# Patient Record
(demographics unavailable — no encounter records)

---

## 2024-11-12 NOTE — ASSESSMENT
[FreeTextEntry1] : 88 yo hx HTN- controlled, MARIAM - not treated (dx 8 ys ago), OA noted back/ hands/ feet and knees, PsA  and mild cognitive impairment and + PPD/ QFT.. (for many ys)  1) PsA: ESR 81 / CRP 3.9 -> 9/21 ESR 21, CRP nl  with nl SI joiints 7/20-> 7/22 SI joints nl on LS films finally with minimal  R SI joint pain/ stiffness- XR 4/24 nl SI joints..since resuming MTX 15 mg/ wk ..  pustular psoriasis also greatly improved. Initially presented with mild synovitis in several IP/ and MCPs and R shoulder pain (but was s/p fall so was difficult to discern.) R shoulder better now following PT and kenalog injection SAB 5/24... though mild discomfort persists, tolerable.   Occas use meloxicam w/ minimal relief..and encouraged to minimize use of NSAIDs  NOTE: Tapered off MTX but noted R SI ttt and more severe pustular psoriasis; also bothered by hair loss When necessary excellent response to steroids  - XR Rt. Hip 03/17/22 showed preserved right hip joint space with chronic mild enthesophyte formation along the peripheral right iliac crest and lateral greater trochanter margin, generalized osteopenia - XR LS 03/27/22 showed no compression fractures or spondylosis - unchanged multilevel degenerative disc disease with disc space narrowing, unremarkable SI joints, generalized osteopenia. hand xray neg for CPPD in 2020 Otezla approved but too expensive $$ 1100/ m..  2) Myofascial pain: intermittently severe usually at upper back/ neck. Presents urgently today with severe pain in R neck/ trap area... exquisite TP... given injection today with kenalog 80 mg and 2 ml xylocaine... in effort to reduce inflammation- since need to avoid daily PO NSAIDs.  GIven degree of discomfort, also gave 30 mg Toradol.. advised to monitor pressures at home, hydration.  Advised to go to ER if any HA,, CP, palpitations.  Articulates awareness of this..  3) Osteopenia: Updated 8/9/22 w/ most severe T score -2.1 with neg VFA and frax 21% overall risk and 7% risk hip fracture... Unable to do comparison 2/2 different equipment. No treatment to date, no hx fractures. Will follow closely... should get updated study now  4) Overweight - Pre-DM?/HTN/ Peripheral edema: Edema: nearly 100% resolved at this time. considerable pitting for several ys, uncomfortable, + US abd 8/22 NAFLD HTN: well controlled Pre-DM - last HgbA1c 6.3 on 09/2021-> stable 1/23 6.2, needs to be coordinated and managed by PCP.. NOTE: Doppler 8/20 negative for DVT (no previous hx) Tx in past w/ diuretics which were stopped when thought to have gout. DOES NOT have GOUT.  5) Mild dyscognition: struggling with short term memory, insight into clinical condition fair at best, was supposed to make appt with neuro but "forgot".. doing fairly well since last visit- caring for self/ son and home- making appointments as needed.- still driving- with no issues reported.  No need to add or adjust at this time. Not sure if she would benefit from treatment to limit progression. Should really f/u with neuro as repeatedly suggested.  Health Mnt: - Colonoscopy > 10 ys ago neg, mammo 2019 neg; no recent CXR , Dexa 2020 nl, will repeat by end of year 2022 Vaccinations:  Needs to hold MTx 1 wk after any vaccination HD influenza annually Shingrix:  done  PNA: given 13 & 23..  Covid: initial vax w/ 1 booster  RSV   Plan 11/12/24: - Administered 40 Kenalog steroid to R trapezius TP - Give more Tramadol (only has ~5 tabs left) - Follow-up in 3mo  Plan:  - Given R deltoid IM toradol 30 mg - avoid all oral NSAIDS   - TPI 80 mg kenalog and 2 ml xylocaine R sided TPI trapezius muscle  - HD fluzone L deltoid   - complete labs before next visit (orders on file)  - continue current regimen   - continue methotrexate 6 pills weekly  - Increase  folic acid to 3 mg daily..  everyday besides the day you take the methotrexate to help with hair thinning   - RPA 3 m   - hold MTX following any vaccination..   - -Is aware to call if there is any change in her underlying symptoms- or CP, SOB/ MCCORMICK or severe HA go to ER / call 911

## 2024-11-12 NOTE — PHYSICAL EXAM
[General Appearance - Alert] : alert [General Appearance - In No Acute Distress] : in no acute distress [General Appearance - Well Nourished] : well nourished [General Appearance - Well Developed] : well developed [General Appearance - Well-Appearing] : healthy appearing [Sclera] : the sclera and conjunctiva were normal [PERRL With Normal Accommodation] : pupils were equal in size, round, and reactive to light [Outer Ear] : the ears and nose were normal in appearance [Examination Of The Oral Cavity] : the lips and gums were normal [Oropharynx] : the oropharynx was normal [Neck Appearance] : the appearance of the neck was normal [] : no respiratory distress [Respiration, Rhythm And Depth] : normal respiratory rhythm and effort [Exaggerated Use Of Accessory Muscles For Inspiration] : no accessory muscle use [Auscultation Breath Sounds / Voice Sounds] : lungs were clear to auscultation bilaterally [Heart Rate And Rhythm] : heart rate was normal and rhythm regular [Heart Sounds] : normal S1 and S2 [Heart Sounds Gallop] : no gallops [Murmurs] : no murmurs [Heart Sounds Pericardial Friction Rub] : no pericardial rub [Veins - Varicosity Changes] : there were no varicosital changes [Breast Appearance] : normal in appearance [Breast Palpation Mass] : no palpable masses [Cervical Lymph Nodes Enlarged Posterior Bilaterally] : posterior cervical [Cervical Lymph Nodes Enlarged Anterior Bilaterally] : anterior cervical [Supraclavicular Lymph Nodes Enlarged Bilaterally] : supraclavicular [No CVA Tenderness] : no ~M costovertebral angle tenderness [No Spinal Tenderness] : no spinal tenderness [Skin Color & Pigmentation] : normal skin color and pigmentation [Skin Turgor] : normal skin turgor [Motor Exam] : the motor exam was normal [No Focal Deficits] : no focal deficits [Oriented To Time, Place, And Person] : oriented to person, place, and time [Affect] : the affect was normal [Mood] : the mood was normal [FreeTextEntry1] : BL shoulders lack 5-10 (improved) full abduction active/ passive with POM R and point minimal TTP throughout, previous fullness resolved-  R hip limited by 20-30 degrees and L hip by 10-20 degrees but no POM; bl knees lacking 5-10 degrees R slightly> L; no active synovitis ttt above b/l radial styloid process- RESOLVED, fROM of wrists and fingers,

## 2024-11-12 NOTE — PROCEDURE
[Today's Date:] : Date: [unfilled] [Risks] : risks [Benefits] : benefits [Alternatives] : alternatives [Consent Obtained] : written consent was obtained prior to the procedure and is detailed in the patient's record [Patient] : Prior to the start of the procedure a time out was taken and the identity of the patient was confirmed via name and date of birth with the patient. The correct site and the procedure to be performed were confirmed. The correct side was confirmed if applicable. The availability of the correct equipment was verified [Therapeutic] : therapeutic [#1 Site: ______] : #1 site identified in the [unfilled] [___ ml Inj] : [unfilled] ~Uml [1%] : 1%  [Alcohol] : alcohol [25 gauge 1 inch] : A 25 gauge 1 inch needle was used [___ml Steriod Preparation] : [unfilled] ml of steriod preparation  [Tolerated Well] : the patient tolerated the procedure well [No Complications] : there were no complications [Instructions Given] : handouts/patient instructions were given to patient [Patient Instructed to Call] : patient was instructed to call if redness at site, a decrease in range of motion or an increase in pain is noted after procedure. [de-identified] : Used 0.5cc of 80 Kenalog [FreeTextEntry1] : This is a strong steroid.. can cause anxiety, hunger, irritability, trouble sleeping, rarely causes palpitations or chest pain but if present go to ER and then let me know

## 2024-11-12 NOTE — DATA REVIEWED
[FreeTextEntry1] : Labs 3/20 ESR 81/ CRP 3.26, CMP x Cr 1.35 w/ GFR 36, uric acid 5.0, nl C3/4  SPEP, CBC, vit D 75, TSh 0.97\par  HbA1c 6.5 \par  neg RF, CCP, CHAU, dsDNA, EDMUNDO, SSA/ B\par  \par  Imaging: \par  4/21 MR shoulder R :  diffuse tendinosis RTC/ triceps w/ hematoma and partial tear inferior GH ligament, non displaced subchondral fracture, Moderate GH effusion w/ hematoma.. \par  \par  SI joints 7/20 nl but chronic enthesopathic change both GTB and peripheral iliac crest margins... \par  CXR 7/20 nl \par  \par  Hand 1/20 nl w/ no erosions but generalized mild osteopenia no discrete lytic or blastic lesions

## 2024-11-12 NOTE — HISTORY OF PRESENT ILLNESS
[FreeTextEntry1] : 11/12/24  CC: Severe neck pain Gave TPI in neck last visit, helped  Has tarted PT since, helps sometimes but also sometimes makes it worse, was told to take this week off Has 37.5/325 Tramadol, took maybe 2-3 times past month - gets relief from it but about the same was from Tylenol Takes 2 Tylenol in a day when she's in pain Blood tests look good    9/24/24  had been doing well now presents with acute severe R sided neck / shoulder pain throughout the day with limited ROM neck and R shoulder fROM Stable overall..  R shoulder pain improved w/ 40 mg into SAB last visit 5/30/24.. but still uncomfortable - no longer limits activity.  Stable meds/ labs.. no recent illness.  Remains off steroids continues w/ 15 mg MTX  Psoriasis fully controlled  -stable wt 135-145 up/ down over past year.  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 OA noted hands/ feet and knees -  New onset Pain in R hand radiating to elbow and feet/ L knee , stiffness in multiple PIP/ wrist noted initially in Jan with severe stiffness up to 60 mins no other joints at this time... in past 4 ys ago b/l feet, seen by rheum   given wrist splint and steroids w/ + response last dose more than 1 m ago.. G4 G2 2 tubal pregnancies s/p Hysterectomy.  Tx  prednisone taper with + response.   Occas use meloxicam w/ minimal  relief..  Associated w/ chronic fatigue, occas clammy sensation in am, resolves, no lymphadenopathy,  Recently had all teeth pulled on top.. mild persistent nausea every since... no considerable wt gain/ loss no new rashes..   Health Mnt:  - Colonoscopy 10 ys ago neg, mammo 2019 neg; no recent CXR , Dexa few ys ago nl

## 2024-11-12 NOTE — REVIEW OF SYSTEMS
[Dry Eyes] : dryness of the eyes [As Noted in HPI] : as noted in HPI [Arthralgias] : arthralgias [Joint Stiffness] : joint stiffness [Skin Lesions] : skin lesion [Difficulty Walking] : difficulty walking [Negative] : Heme/Lymph [Lower Ext Edema] : no lower extremity edema [SOB on Exertion] : no shortness of breath during exertion [Dysuria] : no dysuria [Dizziness] : no dizziness [FreeTextEntry2] : functional - completes all household chores, lives with 2 sons (ages 62 & 66) and remains active - wt relatively stable - despite c/o stiffness [FreeTextEntry3] : Previous retinal tear resolved FULLY-  [FreeTextEntry4] : has postnasal drip  [FreeTextEntry5] : denies edema.. [FreeTextEntry8] : hx of incontinence in past.  [FreeTextEntry9] : worse in her hips, knees, and back over the past 2 weeks  [de-identified] : no active rash;

## 2025-02-07 NOTE — REVIEW OF SYSTEMS
[Dry Eyes] : dryness of the eyes [Arthralgias] : arthralgias [Joint Stiffness] : joint stiffness [Skin Lesions] : skin lesion [Difficulty Walking] : difficulty walking [Negative] : Heme/Lymph [As Noted in HPI] : as noted in HPI [Joint Pain] : joint pain [Lower Ext Edema] : no lower extremity edema [SOB on Exertion] : no shortness of breath during exertion [Dysuria] : no dysuria [Dizziness] : no dizziness [FreeTextEntry2] : functional - completes all household chores, lives with 2 sons (ages 62 & 66) and remains active - wt relatively stable - despite c/o stiffness [FreeTextEntry3] : Previous retinal tear resolved FULLY-  [FreeTextEntry4] : has postnasal drip  [FreeTextEntry5] : denies edema.. [FreeTextEntry8] : hx of incontinence in past.  [FreeTextEntry9] : New LUE wrist to L deltoid tenderness and warm to touch < worse in her hips, knees, and back over the past 2 weeks  [de-identified] : no active rash;

## 2025-02-07 NOTE — PHYSICAL EXAM
[General Appearance - Alert] : alert [General Appearance - In No Acute Distress] : in no acute distress [General Appearance - Well Nourished] : well nourished [General Appearance - Well Developed] : well developed [General Appearance - Well-Appearing] : healthy appearing [Sclera] : the sclera and conjunctiva were normal [PERRL With Normal Accommodation] : pupils were equal in size, round, and reactive to light [Outer Ear] : the ears and nose were normal in appearance [Examination Of The Oral Cavity] : the lips and gums were normal [Oropharynx] : the oropharynx was normal [Neck Appearance] : the appearance of the neck was normal [] : no respiratory distress [Respiration, Rhythm And Depth] : normal respiratory rhythm and effort [Exaggerated Use Of Accessory Muscles For Inspiration] : no accessory muscle use [Auscultation Breath Sounds / Voice Sounds] : lungs were clear to auscultation bilaterally [Heart Rate And Rhythm] : heart rate was normal and rhythm regular [Heart Sounds] : normal S1 and S2 [Heart Sounds Gallop] : no gallops [Murmurs] : no murmurs [Heart Sounds Pericardial Friction Rub] : no pericardial rub [Veins - Varicosity Changes] : there were no varicosital changes [Breast Appearance] : normal in appearance [Breast Palpation Mass] : no palpable masses [Cervical Lymph Nodes Enlarged Posterior Bilaterally] : posterior cervical [Cervical Lymph Nodes Enlarged Anterior Bilaterally] : anterior cervical [Supraclavicular Lymph Nodes Enlarged Bilaterally] : supraclavicular [No CVA Tenderness] : no ~M costovertebral angle tenderness [Skin Color & Pigmentation] : normal skin color and pigmentation [Skin Turgor] : normal skin turgor [Motor Exam] : the motor exam was normal [No Focal Deficits] : no focal deficits [Oriented To Time, Place, And Person] : oriented to person, place, and time [Affect] : the affect was normal [Mood] : the mood was normal [No Spinal Tenderness] : no spinal tenderness [FreeTextEntry1] : TOday fROM b/l shoulders, LUE wtt, more flushed color, ttp L wrist, w/ fROM, no ttp distal to the wrist, L deltoid and forearm ttp. Previously BL shoulders lack 5-10 (improved) full abduction active/ passive with POM R and point minimal TTP throughout, previous fullness resolved-  R hip limited by 20-30 degrees and L hip by 10-20 degrees but no POM; bl knees lacking 5-10 degrees R slightly> L; no active synovitis ttt above b/l radial styloid process- RESOLVED, fROM of wrists and fingers,

## 2025-02-07 NOTE — DATA REVIEWED
[FreeTextEntry1] : Labs 3/20 ESR 81/ CRP 3.26, CMP x Cr 1.35 w/ GFR 36, uric acid 5.0, nl C3/4  SPEP, CBC, vit D 75, TSh 0.97\par  HbA1c 6.5 \par  neg RF, CCP, CHAU, dsDNA, EDMUNDO, SSA/ B\par  \par  Imaging: \par  4/21 MR shoulder R :  diffuse tendinosis RTC/ triceps w/ hematoma and partial tear inferior GH ligament, non displaced subchondral fracture, Moderate GH effusion w/ hematoma.. \par  \par  SI joints 7/20 nl but chronic enthesopathic change both GTB and peripheral iliac crest margins... \par  CXR 7/20 nl \par  \par  Hand 1/20 nl w/ no erosions but generalized mild osteopenia no discrete lytic or blastic lesions 3 = A little assistance

## 2025-02-07 NOTE — PHYSICAL EXAM
[General Appearance - Alert] : alert [General Appearance - In No Acute Distress] : in no acute distress [General Appearance - Well Nourished] : well nourished [General Appearance - Well Developed] : well developed [General Appearance - Well-Appearing] : healthy appearing [Sclera] : the sclera and conjunctiva were normal [PERRL With Normal Accommodation] : pupils were equal in size, round, and reactive to light [Outer Ear] : the ears and nose were normal in appearance [Examination Of The Oral Cavity] : the lips and gums were normal [Oropharynx] : the oropharynx was normal [Neck Appearance] : the appearance of the neck was normal [] : no respiratory distress [Respiration, Rhythm And Depth] : normal respiratory rhythm and effort [Exaggerated Use Of Accessory Muscles For Inspiration] : no accessory muscle use [Auscultation Breath Sounds / Voice Sounds] : lungs were clear to auscultation bilaterally [Heart Rate And Rhythm] : heart rate was normal and rhythm regular [Heart Sounds] : normal S1 and S2 [Heart Sounds Gallop] : no gallops [Murmurs] : no murmurs [Heart Sounds Pericardial Friction Rub] : no pericardial rub [Veins - Varicosity Changes] : there were no varicosital changes [Breast Palpation Mass] : no palpable masses [Breast Appearance] : normal in appearance [Cervical Lymph Nodes Enlarged Posterior Bilaterally] : posterior cervical [Cervical Lymph Nodes Enlarged Anterior Bilaterally] : anterior cervical [Supraclavicular Lymph Nodes Enlarged Bilaterally] : supraclavicular [No CVA Tenderness] : no ~M costovertebral angle tenderness [Skin Color & Pigmentation] : normal skin color and pigmentation [Skin Turgor] : normal skin turgor [Motor Exam] : the motor exam was normal [No Focal Deficits] : no focal deficits [Oriented To Time, Place, And Person] : oriented to person, place, and time [Affect] : the affect was normal [Mood] : the mood was normal [No Spinal Tenderness] : no spinal tenderness [FreeTextEntry1] : TOday fROM b/l shoulders, LUE wtt, more flushed color, ttp L wrist, w/ fROM, no ttp distal to the wrist, L deltoid and forearm ttp. Previously BL shoulders lack 5-10 (improved) full abduction active/ passive with POM R and point minimal TTP throughout, previous fullness resolved-  R hip limited by 20-30 degrees and L hip by 10-20 degrees but no POM; bl knees lacking 5-10 degrees R slightly> L; no active synovitis ttt above b/l radial styloid process- RESOLVED, fROM of wrists and fingers,

## 2025-02-07 NOTE — REVIEW OF SYSTEMS
[Dry Eyes] : dryness of the eyes [Arthralgias] : arthralgias [Joint Stiffness] : joint stiffness [Skin Lesions] : skin lesion [Difficulty Walking] : difficulty walking [Negative] : Heme/Lymph [As Noted in HPI] : as noted in HPI [Joint Pain] : joint pain [Lower Ext Edema] : no lower extremity edema [SOB on Exertion] : no shortness of breath during exertion [Dysuria] : no dysuria [Dizziness] : no dizziness [FreeTextEntry2] : functional - completes all household chores, lives with 2 sons (ages 62 & 66) and remains active - wt relatively stable - despite c/o stiffness [FreeTextEntry3] : Previous retinal tear resolved FULLY-  [FreeTextEntry4] : has postnasal drip  [FreeTextEntry5] : denies edema.. [FreeTextEntry8] : hx of incontinence in past.  [FreeTextEntry9] : New LUE wrist to L deltoid tenderness and warm to touch < worse in her hips, knees, and back over the past 2 weeks  [de-identified] : no active rash;

## 2025-02-07 NOTE — ASSESSMENT
[FreeTextEntry1] : 90 yo hx HTN- controlled, MARIAM - not treated (dx 8 ys ago), OA noted back/ hands/ feet, knees and cervical , PsA  and mild cognitive impairment and + PPD/ QFT.. (for many ys)  1) PsA: ESR 81 / CRP 3.9 -> 9/21 ESR 21, CRP nl  with nl SI joiints 7/20-> 7/22 SI joints nl on LS films finally with minimal  R SI joint pain/ stiffness- XR 4/24 nl SI joints..since resuming MTX 15 mg/ wk ..  pustular psoriasis also greatly improved. Initially presented with mild synovitis in several IP/ and MCPs and R shoulder pain (but was s/p fall so was difficult to discern.) R shoulder better now following PT and kenalog injection SAB 5/24... though mild discomfort persists, tolerable- pain more focused in trapezius (see below).   Occas use meloxicam w/ minimal relief..and encouraged to minimize use of NSAIDs  NOTE: Tapered off MTX but noted R SI ttt and more severe pustular psoriasis; also bothered by hair loss When necessary excellent response to steroids  - XR Rt. Hip 03/17/22 showed preserved right hip joint space with chronic mild enthesophyte formation along the peripheral right iliac crest and lateral greater trochanter margin, generalized osteopenia - XR LS 03/27/22 showed no compression fractures or spondylosis - unchanged multilevel degenerative disc disease with disc space narrowing, unremarkable SI joints, generalized osteopenia. hand xray neg for CPPD in 2020 Otezla approved but too expensive $$ 1100/ m..  2) Myofascial pain: intermittently severe usually at upper back/ neck. Presents urgently today with severe pain in R neck/ trap area... exquisite TP... given injection today with kenalog 40 mg (used 0.5 ml- given 40 mg of 80 mg vial) and 2 ml xylocaine... in effort to reduce inflammation- since need to avoid daily PO NSAIDs.  GIven degree of discomfort, Advised to go to ER if any HA,, CP, palpitations.  Articulates awareness of this..  3) Osteopenia: Updated 8/9/22 w/ most severe T score -2.1 with neg VFA and frax 21% overall risk and 7% risk hip fracture... Unable to do comparison 2/2 different equipment. No treatment to date, no hx fractures. Will follow closely... should get updated study now  4) Overweight - Pre-DM?/HTN/ Peripheral edema: Edema: nearly 100% resolved at this time. considerable pitting for several ys, uncomfortable, + US abd 8/22 NAFLD HTN: well controlled Pre-DM - last HgbA1c 6.3 on 09/2021-> stable 1/23 6.2, needs to be coordinated and managed by PCP.. NOTE: Doppler 8/20 negative for DVT (no previous hx) Tx in past w/ diuretics which were stopped when thought to have gout. DOES NOT have GOUT.  5) Mild dyscognition: struggling with short term memory, insight into clinical condition fair at best, was supposed to make appt with neuro but "forgot".. doing fairly well since last visit- caring for self/ son and home- making appointments as needed.- still driving- with no issues reported.  No need to add or adjust at this time. Not sure if she would benefit from treatment to limit progression. Should really f/u with neuro as repeatedly suggested.  Health Mnt: - Colonoscopy > 10 ys ago neg, mammo 2019 neg; no recent CXR , Dexa 2020 nl, will repeat by end of year 2022 Vaccinations:  Needs to hold MTx 1 wk after any vaccination HD influenza annually Shingrix:  done  PNA: given 13 & 23..  Covid: initial vax w/ 1 booster  RSV   PLAN 2/7/25: -US L wrist - MSK, arterial, and venous doppler LUE further evaluation -Continue Celebrex 200mg daily -Continue MTX 6 tabs weekly -Additional labs taken today -Will call patient once results of labs and imaging received  Plan 11/12/24: - Administered 40 Kenalog steroid to R trapezius TP  - Give more Tramadol (only has ~5 tabs left)  - Follow-up in 3mo  - continue methotrexate 6 pills weekly  - continue  folic acid to 3 mg daily..  everyday besides the day you take the methotrexate to help with hair thinning   - -Is aware to call if there is any change in her underlying symptoms- or CP, SOB/ MCCORMICK or severe HA go to ER / call [phone]

## 2025-02-07 NOTE — ASSESSMENT
[FreeTextEntry1] : 88 yo hx HTN- controlled, MARIAM - not treated (dx 8 ys ago), OA noted back/ hands/ feet, knees and cervical , PsA  and mild cognitive impairment and + PPD/ QFT.. (for many ys)  1) PsA: ESR 81 / CRP 3.9 -> 9/21 ESR 21, CRP nl  with nl SI joiints 7/20-> 7/22 SI joints nl on LS films finally with minimal  R SI joint pain/ stiffness- XR 4/24 nl SI joints..since resuming MTX 15 mg/ wk ..  pustular psoriasis also greatly improved. Initially presented with mild synovitis in several IP/ and MCPs and R shoulder pain (but was s/p fall so was difficult to discern.) R shoulder better now following PT and kenalog injection SAB 5/24... though mild discomfort persists, tolerable- pain more focused in trapezius (see below).   Occas use meloxicam w/ minimal relief..and encouraged to minimize use of NSAIDs  NOTE: Tapered off MTX but noted R SI ttt and more severe pustular psoriasis; also bothered by hair loss When necessary excellent response to steroids  - XR Rt. Hip 03/17/22 showed preserved right hip joint space with chronic mild enthesophyte formation along the peripheral right iliac crest and lateral greater trochanter margin, generalized osteopenia - XR LS 03/27/22 showed no compression fractures or spondylosis - unchanged multilevel degenerative disc disease with disc space narrowing, unremarkable SI joints, generalized osteopenia. hand xray neg for CPPD in 2020 [facility] approved but too expensive $$ 1100/ m..  2) Myofascial pain: intermittently severe usually at upper back/ neck. Presents urgently today with severe pain in R neck/ trap area... exquisite TP... given injection today with kenalog 40 mg (used 0.5 ml- given 40 mg of 80 mg vial) and 2 ml xylocaine... in effort to reduce inflammation- since need to avoid daily PO NSAIDs.  GIven degree of discomfort, Advised to go to ER if any HA,, CP, palpitations.  Articulates awareness of this..  3) Osteopenia: Updated 8/9/22 w/ most severe T score -2.1 with neg VFA and frax 21% overall risk and 7% risk hip fracture... Unable to do comparison 2/2 different equipment. No treatment to date, no hx fractures. Will follow closely... should get updated study now  4) Overweight - Pre-DM?/HTN/ Peripheral edema: Edema: nearly 100% resolved at this time. considerable pitting for several ys, uncomfortable, + US abd 8/22 NAFLD HTN: well controlled Pre-DM - last HgbA1c 6.3 on 09/2021-> stable 1/23 6.2, needs to be coordinated and managed by PCP.. NOTE: Doppler 8/20 negative for DVT (no previous hx) Tx in past w/ diuretics which were stopped when thought to have gout. DOES NOT have GOUT.  5) Mild dyscognition: struggling with short term memory, insight into clinical condition fair at best, was supposed to make appt with neuro but "forgot".. doing fairly well since last visit- caring for self/ son and home- making appointments as needed.- still driving- with no issues reported.  No need to add or adjust at this time. Not sure if she would benefit from treatment to limit progression. Should really f/u with neuro as repeatedly suggested.  Health Mnt: - Colonoscopy > 10 ys ago neg, mammo 2019 neg; no recent CXR , Dexa 2020 nl, will repeat by end of year 2022 Vaccinations:  Needs to hold MTx 1 wk after any vaccination HD influenza annually Shingrix:  done  PNA: given 13 & 23..  Covid: initial vax w/ 1 booster  RSV   PLAN 2/7/25: -US L wrist - MSK, arterial, and venous doppler LUE further evaluation -Continue Celebrex 200mg daily -Continue MTX 6 tabs weekly -Additional labs taken today -Will call patient once results of labs and imaging received  Plan 11/12/24: - Administered 40 Kenalog steroid to R trapezius TP  - Give more Tramadol (only has ~5 tabs left)  - Follow-up in 3mo  - continue methotrexate 6 pills weekly  - continue  folic acid to 3 mg daily..  everyday besides the day you take the methotrexate to help with hair thinning   - -Is aware to call if there is any change in her underlying symptoms- or CP, SOB/ MCCORMICK or severe HA go to ER / call [phone]

## 2025-02-07 NOTE — ASSESSMENT
[FreeTextEntry1] : 88 yo hx HTN- controlled, MARIAM - not treated (dx 8 ys ago), OA noted back/ hands/ feet, knees and cervical , PsA  and mild cognitive impairment and + PPD/ QFT.. (for many ys)  1) PsA: ESR 81 / CRP 3.9 -> 9/21 ESR 21, CRP nl  with nl SI joiints 7/20-> 7/22 SI joints nl on LS films finally with minimal  R SI joint pain/ stiffness- XR 4/24 nl SI joints..since resuming MTX 15 mg/ wk ..  pustular psoriasis also greatly improved. Initially presented with mild synovitis in several IP/ and MCPs and R shoulder pain (but was s/p fall so was difficult to discern.) R shoulder better now following PT and kenalog injection SAB 5/24... though mild discomfort persists, tolerable- pain more focused in trapezius (see below).   Occas use meloxicam w/ minimal relief..and encouraged to minimize use of NSAIDs  NOTE: Tapered off MTX but noted R SI ttt and more severe pustular psoriasis; also bothered by hair loss When necessary excellent response to steroids  - XR Rt. Hip 03/17/22 showed preserved right hip joint space with chronic mild enthesophyte formation along the peripheral right iliac crest and lateral greater trochanter margin, generalized osteopenia - XR LS 03/27/22 showed no compression fractures or spondylosis - unchanged multilevel degenerative disc disease with disc space narrowing, unremarkable SI joints, generalized osteopenia. hand xray neg for CPPD in 2020 [facility] approved but too expensive $$ 1100/ m..  2) Myofascial pain: intermittently severe usually at upper back/ neck. Presents urgently today with severe pain in R neck/ trap area... exquisite TP... given injection today with kenalog 40 mg (used 0.5 ml- given 40 mg of 80 mg vial) and 2 ml xylocaine... in effort to reduce inflammation- since need to avoid daily PO NSAIDs.  GIven degree of discomfort, Advised to go to ER if any HA,, CP, palpitations.  Articulates awareness of this..  3) Osteopenia: Updated 8/9/22 w/ most severe T score -2.1 with neg VFA and frax 21% overall risk and 7% risk hip fracture... Unable to do comparison 2/2 different equipment. No treatment to date, no hx fractures. Will follow closely... should get updated study now  4) Overweight - Pre-DM?/HTN/ Peripheral edema: Edema: nearly 100% resolved at this time. considerable pitting for several ys, uncomfortable, + US abd 8/22 NAFLD HTN: well controlled Pre-DM - last HgbA1c 6.3 on 09/2021-> stable 1/23 6.2, needs to be coordinated and managed by PCP.. NOTE: Doppler 8/20 negative for DVT (no previous hx) Tx in past w/ diuretics which were stopped when thought to have gout. DOES NOT have GOUT.  5) Mild dyscognition: struggling with short term memory, insight into clinical condition fair at best, was supposed to make appt with neuro but "forgot".. doing fairly well since last visit- caring for self/ son and home- making appointments as needed.- still driving- with no issues reported.  No need to add or adjust at this time. Not sure if she would benefit from treatment to limit progression. Should really f/u with neuro as repeatedly suggested.  Health Mnt: - Colonoscopy > 10 ys ago neg, mammo 2019 neg; no recent CXR , Dexa 2020 nl, will repeat by end of year 2022 Vaccinations:  Needs to hold MTx 1 wk after any vaccination HD influenza annually Shingrix:  done  PNA: given 13 & 23..  Covid: initial vax w/ 1 booster  RSV   PLAN 2/7/25: -US L wrist - MSK, arterial, and venous doppler LUE further evaluation -Continue Celebrex 200mg daily -Continue MTX 6 tabs weekly -Additional labs taken today -Will call patient once results of labs and imaging received  Plan 11/12/24: - Administered 40 Kenalog steroid to R trapezius TP  - Give more Tramadol (only has ~5 tabs left)  - Follow-up in 3mo  - continue methotrexate 6 pills weekly  - continue  folic acid to 3 mg daily..  everyday besides the day you take the methotrexate to help with hair thinning   - -Is aware to call if there is any change in her underlying symptoms- or CP, SOB/ MCCORMICK or severe HA go to ER / call [phone]

## 2025-02-07 NOTE — HISTORY OF PRESENT ILLNESS
[FreeTextEntry1] : 2/7/25  -Presents L wrist pain started 1/26/25 notified PCP who ordered XR L wrist no evidence fx or erosions XR notes mild OA changes thorughout -Started Celebrex 200mg daily, pain started to improve 2/4/25 but also noted increased pain L deltoid -Pain is consistent throughout the day, has been elevating hand and using brace (intended for R wrist) -Continues regimen MTX 6 tabs weekly well tolerated -VS stable do not suggest active infection  2/3/25 labs: CRP 5, eGFR 29, chronic anemia, ESR 47; neg/nl UA B12 and folate not completed, no recent sUA  ---------------- 11/12/24 Urgent visit for  CC: Severe neck pain Gave TPI in neck last visit, helped..would like repeat course now  Has started PT since, helps sometimes but also sometimes makes it worse, was told to take this week off Has 37.5/325 Tramadol, took maybe 2-3 times past month - gets relief from it but about the same was from Tylenol Takes 2 Tylenol in a day when she's in pain Blood tests look good  R shoulder pain improved w/ 40 mg into SAB last visit 5/30/24.. but still uncomfortable - no longer limits activity.  Stable meds/ labs.. no recent illness.  Remains off steroids continues w/ 15 mg MTX  Psoriasis fully controlled  -stable wt 135-145 up/ down over past year.  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 OA noted hands/ feet and knees -  New onset Pain in R hand radiating to elbow and feet/ L knee , stiffness in multiple PIP/ wrist noted initially in Jan with severe stiffness up to 60 mins no other joints at this time... in past 4 ys ago b/l feet, seen by rheum   given wrist splint and steroids w/ + response last dose more than 1 m ago.. G4 G2 2 tubal pregnancies s/p Hysterectomy.  Tx  prednisone taper with + response.   Occas use meloxicam w/ minimal  relief..  Associated w/ chronic fatigue, occas clammy sensation in am, resolves, no lymphadenopathy,  Recently had all teeth pulled on top.. mild persistent nausea every since... no considerable wt gain/ loss no new rashes..   Health Mnt:  - Colonoscopy 10 ys ago neg, mammo 2019 neg; no recent CXR , Dexa few ys ago nl

## 2025-02-07 NOTE — HISTORY OF PRESENT ILLNESS
[FreeTextEntry1] : 2/7/25  -Presents L wrist pain started 1/26/25 notified PCP who ordered XR L wrist no evidence fx or erosions XR notes mild OA changes thorughout -Started Celebrex 200mg daily, pain started to improve 2/4/25 but also noted increased pain L deltoid -Pain is consistent throughout the day, has been elevating hand and using brace (intended for R wrist) -Continues regimen MTX 6 tabs weekly well tolerated -VS stable do not suggest active infection  2/3/25 labs: CRP 5, eGFR 29, chronic anemia, ESR 47; neg/nl UA B12 and folate not completed, no recent sUA  ---------------- 11/12/24 Urgent visit for  CC: Severe neck pain Gave TPI in neck last visit, helped..would like repeat course now  Has started PT since, helps sometimes but also sometimes makes it worse, was told to take this week off Has 37.5/325 Tramadol, took maybe 2-3 times past month - gets relief from it but about the same was from Tylenol Takes 2 Tylenol in a day when she's in pain Blood tests look good  R shoulder pain improved w/ 40 mg into SAB last visit 5/30/24.. but still uncomfortable - no longer limits activity.  Stable meds/ labs.. no recent illness.  Remains off steroids continues w/ 15 mg MTX  Psoriasis fully controlled  -stable wt 135-145 up/ down over past year.  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 OA noted hands/ feet and knees -  New onset Pain in R hand radiating to elbow and feet/ L knee , stiffness in multiple PIP/ wrist noted initially in Jan with severe stiffness up to 60 mins no other joints at this time... in past 4 ys ago b/l feet, seen by rheum   given wrist splint and steroids w/ + response last dose more than 1 m ago.. G4 G2 2 tubal pregnancies s/p Hysterectomy.  Tx  prednisone taper with + response.   Occas use meloxicam w/ minimal  relief..  Associated w/ chronic fatigue, occas clammy sensation in am, resolves, no lymphadenopathy,  Recently had all teeth pulled on top.. mild persistent nausea every since... no considerable wt gain/ loss no new rashes..   Health Mnt:  - Colonoscopy 10 ys ago neg, mammo 2019 neg; no recent CXR , Dexa few ys ago nl

## 2025-02-07 NOTE — REVIEW OF SYSTEMS
[Dry Eyes] : dryness of the eyes [Arthralgias] : arthralgias [Joint Stiffness] : joint stiffness [Skin Lesions] : skin lesion [Difficulty Walking] : difficulty walking [Negative] : Heme/Lymph [As Noted in HPI] : as noted in HPI [Joint Pain] : joint pain [Lower Ext Edema] : no lower extremity edema [SOB on Exertion] : no shortness of breath during exertion [Dysuria] : no dysuria [Dizziness] : no dizziness [FreeTextEntry2] : functional - completes all household chores, lives with 2 sons (ages 62 & 66) and remains active - wt relatively stable - despite c/o stiffness [FreeTextEntry3] : Previous retinal tear resolved FULLY-  [FreeTextEntry4] : has postnasal drip  [FreeTextEntry5] : denies edema.. [FreeTextEntry8] : hx of incontinence in past.  [FreeTextEntry9] : New LUE wrist to L deltoid tenderness and warm to touch < worse in her hips, knees, and back over the past 2 weeks  [de-identified] : no active rash;

## 2025-03-07 NOTE — ASSESSMENT
[FreeTextEntry1] : 90 yo hx HTN- controlled, MARIAM - not treated (dx 8 ys ago), OA noted back/ hands/ feet, knees and cervical , PsA  and mild cognitive impairment and + PPD/ QFT.. (for many ys)  **ACUTE Visit 2/7/25 Presents L wrist pain started 1/26/25 notified PCP who ordered XR L wrist no evidence fx or erosions XR notes mild OA changes thorughout Started Celebrex 200mg daily, pain started to improve 2/4/25 but also noted increased pain L deltoid Pain is consistent throughout the day, has been elevating hand and using brace (intended for R wrist) Continues regimen MTX 6 tabs weekly well tolerated VS stable do not suggest active infection On exam: Today fROM b/l shoulders, LUE wtt, more flushed color, ttp L wrist, w/ fROM, no ttp distal to the wrist, L deltoid and forearm ttp Presentation not s/w PsA or RA flare. Recent labs 2/25 stable additional sUA ordered today r/o gout Would recommend additional imaging eval for possible arterial or venous abn  1) PsA: initial ESR 81 / CRP 3.9 -> 9/21 ESR 21, CRP nl  with nl SI joiints 7/20-> 7/22 -> finally with minimal  R SI joint pain/ stiffness- XR 4/24 nl SI joints.  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 stable dose 15 mg wk tolerated fairly well Initially presented with mild synovitis in several IP/ and MCPs and R shoulder pain (but was s/p fall so was difficult to discern.) and overt pustular PsO-  -  R shoulder better now following PT and kenalog injection SAB 5/24... though mild discomfort persists, tolerable- pain more focused in trapezius (see below).  NOW with LUE fullness, precise etiology of swelling unclear- needs additional imaging- need to r/o possible arterial/ venous / Lymphadenopathy... doppler study ordered Occas use meloxicam w/ minimal relief..and encouraged to minimize use of NSAIDs  NOTE: Tapered off MTX but noted R SI ttt and more severe pustular psoriasis; also bothered by hair loss When necessary excellent response to steroids  - XR Rt. Hip 03/17/22 showed preserved right hip joint space with chronic mild enthesophyte formation along the peripheral right iliac crest and lateral greater trochanter margin, generalized osteopenia - XR LS 03/27/22 showed no compression fractures or spondylosis - unchanged multilevel degenerative disc disease with disc space narrowing, unremarkable SI joints, generalized osteopenia. hand xray neg for CPPD in 2020 [facility] approved but too expensive $$ 1100/ m..  2) Myofascial pain: intermittently severe usually at upper back/ neck. Presents urgently improved severe pain in R neck/ trap area... exquisite TP... given injection today with kenalog 40 mg- last visit + response.  Relatively controlled at this visit   3) Osteopenia: Updated 8/9/22 w/ most severe T score -2.1 with neg VFA and frax 21% overall risk and 7% risk hip fracture... Unable to do comparison 2/2 different equipment. No treatment to date, no hx fractures. Will follow closely... should get updated study now  4) Overweight - Pre-DM?/HTN/ Peripheral edema: Edema: nearly 100% resolved at this time. considerable pitting for several ys, uncomfortable, + US abd 8/22 NAFLD HTN: well controlled Pre-DM - last HgbA1c 6.3 on 09/2021-> stable 1/23 6.2, needs to be coordinated and managed by PCP.. NOTE: Doppler 8/20 negative for DVT (no previous hx) Tx in past w/ diuretics which were stopped when thought to have gout. DOES NOT have GOUT.  5) Mild dyscognition: struggling with short term memory, insight into clinical condition fair at best, was supposed to make appt with neuro but "forgot".. doing fairly well since last visit- caring for self/ son and home- making appointments as needed.- still driving- with no issues reported.  No need to add or adjust at this time. Not sure if she would benefit from treatment to limit progression. Should really f/u with neuro as repeatedly suggested.  Health Mnt: - Colonoscopy > 10 ys ago neg, mammo 2019 neg; no recent CXR , Dexa 2020 nl, will repeat by end of year 2022 Vaccinations:  Needs to hold MTx 1 wk after any vaccination HD influenza annually Shingrix:  done  PNA: given 13 & 23..  Covid: initial vax w/ 1 booster  RSV   PLAN 2/7/25: -US L wrist - MSK, arterial, and venous doppler LUE further evaluation  -Continue Celebrex 200mg daily as needed   -Did not request renewal tramadol  -Continue MTX 6 tabs weekly  - continue  folic acid to 3 mg daily..  everyday besides the day you take the methotrexate to help with hair thinning   -Additional labs taken today  -Is aware to call if there is any change in her underlying symptoms- or CP, SOB/ MCCORMICK or severe HA go to ER / call [phone]  -Will call patient once results of labs and imaging received

## 2025-03-07 NOTE — ADDENDUM
[FreeTextEntry1] : Labs from todays visit reviewed:  2/7/25 ESR 47, CBC w/ Hb 10.3/ WbC 7.58, plt 277, nl UA 4.8, B12/Folate, CMP with eGFR 49 w/ Cr 1.08, CRP 5  2/7/25 Arterial/ Venous US LUE NEG and MSK L hand/ wrist NEG for synovitis

## 2025-03-07 NOTE — PHYSICAL EXAM
[General Appearance - Alert] : alert [General Appearance - In No Acute Distress] : in no acute distress [General Appearance - Well Nourished] : well nourished [General Appearance - Well Developed] : well developed [General Appearance - Well-Appearing] : healthy appearing [Sclera] : the sclera and conjunctiva were normal [PERRL With Normal Accommodation] : pupils were equal in size, round, and reactive to light [Outer Ear] : the ears and nose were normal in appearance [Examination Of The Oral Cavity] : the lips and gums were normal [Oropharynx] : the oropharynx was normal [Neck Appearance] : the appearance of the neck was normal [] : no respiratory distress [Respiration, Rhythm And Depth] : normal respiratory rhythm and effort [Exaggerated Use Of Accessory Muscles For Inspiration] : no accessory muscle use [Auscultation Breath Sounds / Voice Sounds] : lungs were clear to auscultation bilaterally [Heart Rate And Rhythm] : heart rate was normal and rhythm regular [Heart Sounds] : normal S1 and S2 [Heart Sounds Gallop] : no gallops [Murmurs] : no murmurs [Heart Sounds Pericardial Friction Rub] : no pericardial rub [Veins - Varicosity Changes] : there were no varicosital changes [Breast Appearance] : normal in appearance [Breast Palpation Mass] : no palpable masses [No CVA Tenderness] : no ~M costovertebral angle tenderness [No Spinal Tenderness] : no spinal tenderness [Skin Color & Pigmentation] : normal skin color and pigmentation [Skin Turgor] : normal skin turgor [Motor Exam] : the motor exam was normal [No Focal Deficits] : no focal deficits [Oriented To Time, Place, And Person] : oriented to person, place, and time [Affect] : the affect was normal [Mood] : the mood was normal [FreeTextEntry1] : TOday fROM b/l shoulders, L wrist full/ warm, TTP and POM- limited lacking 5d; R wrist sl full and TTP;, L deltoid and forearm ttp- RESOLVED; . Previously BL shoulders lack 5-10 (improved) full abduction active/ passive with POM R and point minimal TTP throughout, previous fullness resolved-  R hip limited by 20-30 degrees and L hip by 10-20 degrees but no POM; bl knees lacking 5-10 degrees R slightly> L; no active synovitis ttt above b/l radial styloid process- RESOLVED, fROM of wrists and fingers,

## 2025-03-07 NOTE — HISTORY OF PRESENT ILLNESS
[FreeTextEntry1] : 3/7/25 started celebrex with minimal benefit L wrist continues to be uncomfortable, achiness/ at times burning and numbness in all 5 fingers.. few days ago noted tightness/ and numbness in R hand Also feels weakness in wrist.. specifically as well as increased pain with use.   labs 2/11/25 at onset CRP 5, ESR 47, Hb 10.3 (stable) with nl WBC, plt 277 Denies sx of UTI despite elevated WBC/ bacteria in urine  Continues with 6 tabs wkly..  MR Cervical spine 3/3/25 IMPRESSION: Cervical spondylosis resulting in moderate left foraminal narrowing at C3/C4 moderate to severe foraminal narrowing at C4/C5. No spinal canal stenosis    Plan 3/7/25:    2/7/25 ACUTE Visit   -Presents L wrist pain started 1/26/25 notified PCP who ordered XR L wrist no evidence fx or erosions XR notes mild OA changes thorughout -Started Celebrex 200mg daily, pain started to improve 2/4/25 but also noted increased pain L deltoid -Pain is consistent throughout the day, has been elevating hand and using brace (intended for R wrist) -Continues regimen MTX 6 tabs weekly well tolerated -VS stable do not suggest active infection  2/3/25 labs: CRP 5, eGFR 29, chronic anemia, ESR 47; neg/nl UA B12 and folate not completed, no recent sUA  ---------------- 11/12/24 Urgent visit for  CC: Severe neck pain Gave TPI in neck last visit, helped..would like repeat course now  Has started PT since, helps sometimes but also sometimes makes it worse, was told to take this week off Has 37.5/325 Tramadol, took maybe 2-3 times past month - gets relief from it but about the same was from Tylenol Takes 2 Tylenol in a day when she's in pain Blood tests look good  R shoulder pain improved w/ 40 mg into SAB last visit 5/30/24.. but still uncomfortable - no longer limits activity.  Stable meds/ labs.. no recent illness.  Remains off steroids continues w/ 15 mg MTX  Psoriasis fully controlled  -stable wt 135-145 up/ down over past year.  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 OA noted hands/ feet and knees -  New onset Pain in R hand radiating to elbow and feet/ L knee , stiffness in multiple PIP/ wrist noted initially in Jan with severe stiffness up to 60 mins no other joints at this time... in past 4 ys ago b/l feet, seen by rheum   given wrist splint and steroids w/ + response last dose more than 1 m ago.. G4 G2 2 tubal pregnancies s/p Hysterectomy.  Tx  prednisone taper with + response.   Occas use meloxicam w/ minimal  relief..  Associated w/ chronic fatigue, occas clammy sensation in am, resolves, no lymphadenopathy,  Recently had all teeth pulled on top.. mild persistent nausea every since... no considerable wt gain/ loss no new rashes..   Health Mnt:  - Colonoscopy 10 ys ago neg, mammo 2019 neg; no recent CXR , Dexa few ys ago nl

## 2025-03-07 NOTE — DATA REVIEWED
[FreeTextEntry1] : Labs  25 ESR 47, CBC w/ Hb 10.3/ WbC 7.58, plt 277, nl UA 4.8, B12/Folate, CMP with eGFR 49 w/ Cr 1.08, CRP 5  3/20 ESR 81/ CRP 3.26, CMP x Cr 1.35 w/ GFR 36, uric acid 5.0, nl C3/4  SPEP, CBC, vit D 75, TSh 0.97 HbA1c 6.5  neg RF, CCP, CHAU, dsDNA, EDMUNDO, SSA/ B  Imagin25 Arterial/ Venous US LUE NEG and MSK L hand/ wrist NEG for synovitis    MR shoulder R :  diffuse tendinosis RTC/ triceps w/ hematoma and partial tear inferior GH ligament, non displaced subchondral fracture, Moderate GH effusion w/ hematoma..   SI joints  nl but chronic enthesopathic change both GTB and peripheral iliac crest margins...  CXR  nl   Hand  nl w/ no erosions but generalized mild osteopenia no discrete lytic or blastic lesions

## 2025-03-07 NOTE — REVIEW OF SYSTEMS
[Dry Eyes] : dryness of the eyes [As Noted in HPI] : as noted in HPI [Arthralgias] : arthralgias [Joint Pain] : joint pain [Joint Stiffness] : joint stiffness [Skin Lesions] : skin lesion [Difficulty Walking] : difficulty walking [Negative] : Heme/Lymph [Lower Ext Edema] : no lower extremity edema [SOB on Exertion] : no shortness of breath during exertion [Dysuria] : no dysuria [Dizziness] : no dizziness [FreeTextEntry2] : functional - completes all household chores, lives with 2 sons (ages 62 & 66) and remains active - wt relatively stable - despite c/o stiffness [FreeTextEntry3] : Previous retinal tear resolved FULLY-  [FreeTextEntry4] : has postnasal drip  [FreeTextEntry5] : denies edema.. [FreeTextEntry8] : hx of incontinence in past.  [FreeTextEntry9] : New LUE wrist to L deltoid tenderness and warm to touch < worse in her hips, knees, and back over the past 2 weeks  [de-identified] : no active rash;

## 2025-03-07 NOTE — PROCEDURE
[Today's Date:] : Date: [unfilled] [Risks] : risks [Benefits] : benefits [Alternatives] : alternatives [Consent Obtained] : written consent was obtained prior to the procedure and is detailed in the patient's record [Patient] : Prior to the start of the procedure a time out was taken and the identity of the patient was confirmed via name and date of birth with the patient. The correct site and the procedure to be performed were confirmed. The correct side was confirmed if applicable. The availability of the correct equipment was verified [Therapeutic] : therapeutic [#1 Site: ______] : #1 site identified in the [unfilled] [Ethyl Chloride] : ethyl chloride [___ ml Inj] : [unfilled] ~Uml [Betadine] : betadine solution [Alcohol] : alcohol [25 gauge 5/8  inch] : A 25 gauge 5/8 inch needle was used [___ml Steriod Preparation] : [unfilled] ml of steriod preparation  [Tolerated Well] : the patient tolerated the procedure well [No Complications] : there were no complications [Instructions Given] : handouts/patient instructions were given to patient [Patient Instructed to Call] : patient was instructed to call if redness at site, a decrease in range of motion or an increase in pain is noted after procedure. [FreeTextEntry1] : ice routinely 20/20 for next 24 hs and call if pain not improved w/ in 72 or  if worsened joint pain, or signs of infection including fevers, chills, redness at site ensues.  This is a strong steroid.. can cause anxiety, hunger, irritability, trouble sleeping, rarely causes palpitations or chest pain but if present go to ER and then let me know

## 2025-04-09 NOTE — PHYSICAL EXAM
[Well Nourished] : well nourished [Well Developed] : well developed [Normal Oropharynx] : the oropharynx was normal [No JVD] : no jugular venous distention [Supple] : supple [No Respiratory Distress] : no respiratory distress  [Normal Rate] : normal rate  [Regular Rhythm] : with a regular rhythm [Normal S1, S2] : normal S1 and S2 [No Varicosities] : no varicosities [No Extremity Clubbing/Cyanosis] : no extremity clubbing/cyanosis [Soft] : abdomen soft [No Masses] : no abdominal mass palpated [Normal Bowel Sounds] : normal bowel sounds [Normal Supraclavicular Nodes] : no supraclavicular lymphadenopathy [Normal Posterior Cervical Nodes] : no posterior cervical lymphadenopathy [Normal Anterior Cervical Nodes] : no anterior cervical lymphadenopathy [No Joint Swelling] : no joint swelling [Normal Affect] : the affect was normal [Normal Insight/Judgement] : insight and judgment were intact [Clear to Auscultation] : lungs were clear to auscultation bilaterally [No Edema] : there was no peripheral edema [de-identified] : Heberden's and Denita's nodes are present

## 2025-04-09 NOTE — DATA REVIEWED
[FreeTextEntry1] : Spirometric analysis with DLCO was performed.  The vital capacity is normal.  Lung mechanics are within normal limits.  Bronchodilator reactivity is not demonstrated.  The DLCO is moderately reduced however when corrected for alveolar volume it is normal.  The saturation is 96%.  This represents a normal spirometric analysis.  A mild diffusion abnormality is noted.  When compared to the prior study, no significant changes are demonstrated.

## 2025-04-09 NOTE — HISTORY OF PRESENT ILLNESS
[FreeTextEntry1] :  The patient comes in for a routine follow-up pulmonary evaluation. [de-identified] : The patient is feeling well from a pulmonary standpoint. Her breathing has been good, despite a history of restrictive lung disease. The patient still does have some breathlessness with exertion, namely with stairs. She does note that her degree of breathlessness has remained stable, however. She denies any cough, wheeze, or sputum production. She does note a PND. There has been no chest pain or chest tightness. She has not required the use of her rescue inhaler.  The patient has a history of obstructive sleep apnea, though she is non-compliant in wearing her CPAP. She notes that she does dream on a regular basis. Her energy levels are not very good throughout the course of the day. She does take a nap everyday. She experiences occasional daytime somnolence and fatigue.   The patient continues to take Methotrexate 2.5 MG, 6 tabs every week for treatment of inflammatory polyarthropathy. She follows with SAMINA Horn for treatment. There has been no chest pain, palpitations, or PND. There have been no fevers, chills, or night sweats. There have been no other acute constitutional symptoms. She comes in for this assessment.

## 2025-04-09 NOTE — HISTORY OF PRESENT ILLNESS
[FreeTextEntry1] :  The patient comes in for a routine follow-up pulmonary evaluation. [de-identified] : The patient is feeling well from a pulmonary standpoint. Her breathing has been good, despite a history of restrictive lung disease. The patient still does have some breathlessness with exertion, namely with stairs. She does note that her degree of breathlessness has remained stable, however. She denies any cough, wheeze, or sputum production. She does note a PND. There has been no chest pain or chest tightness. She has not required the use of her rescue inhaler.  The patient has a history of obstructive sleep apnea, though she is non-compliant in wearing her CPAP. She notes that she does dream on a regular basis. Her energy levels are not very good throughout the course of the day. She does take a nap everyday. She experiences occasional daytime somnolence and fatigue.   The patient continues to take Methotrexate 2.5 MG, 6 tabs every week for treatment of inflammatory polyarthropathy. She follows with SAMINA Horn for treatment. There has been no chest pain, palpitations, or PND. There have been no fevers, chills, or night sweats. There have been no other acute constitutional symptoms. She comes in for this assessment.

## 2025-04-09 NOTE — PLAN
[FreeTextEntry1] : 1. Continue current medications as outlined above.   2. Follow up in 6 months with PFT.    3. Routine medical follow-up with her primary care physician, Dr. Bill.    4. The Influenza, RSV, and COVID vaccines are recommended in the fall.   5. Cardiovascular exercise as tolerated.

## 2025-04-09 NOTE — PHYSICAL EXAM
[Well Nourished] : well nourished [Well Developed] : well developed [Normal Oropharynx] : the oropharynx was normal [No JVD] : no jugular venous distention [Supple] : supple [No Respiratory Distress] : no respiratory distress  [Normal Rate] : normal rate  [Regular Rhythm] : with a regular rhythm [Normal S1, S2] : normal S1 and S2 [No Varicosities] : no varicosities [No Extremity Clubbing/Cyanosis] : no extremity clubbing/cyanosis [Soft] : abdomen soft [No Masses] : no abdominal mass palpated [Normal Bowel Sounds] : normal bowel sounds [Normal Supraclavicular Nodes] : no supraclavicular lymphadenopathy [Normal Posterior Cervical Nodes] : no posterior cervical lymphadenopathy [Normal Anterior Cervical Nodes] : no anterior cervical lymphadenopathy [No Joint Swelling] : no joint swelling [Normal Affect] : the affect was normal [Normal Insight/Judgement] : insight and judgment were intact [Clear to Auscultation] : lungs were clear to auscultation bilaterally [No Edema] : there was no peripheral edema [de-identified] : Heberden's and Denita's nodes are present

## 2025-04-09 NOTE — ADDENDUM
[FreeTextEntry1] : This note was written by Ashley Borja on 04/09/2025 acting as a medical scribe for Dr. Rogelio Brown MD. All medical entries made by the scribe were at my, Dr. Rogelio Brown's, direction and personally dictated by me on 04/09/2025. I have reviewed the chart and agree that the record accurately reflects my personal performance of the history, review of systems, assessment, and plan. I have also personally directed, reviewed, and agreed with the chart.

## 2025-05-09 NOTE — ASSESSMENT
[FreeTextEntry1] : distribution and time course of rash consistent with bites; no known exposures, no pets   Therapeutic options and their risks and benefits; along with multiple diagnostic possibilities were discussed at length; risks and benefits of further study were discussed;  mometasone ointment BID x 1-2 wks f/u if new lesions continue to appear

## 2025-05-09 NOTE — HISTORY OF PRESENT ILLNESS
[de-identified] : pt. c/o itchy rashes on arm, leg;  severe at times;  present approx 1 week used OTC products;

## 2025-05-09 NOTE — PHYSICAL EXAM
[FreeTextEntry3] :  small, erythematous papules, grouped in two/three -  right arm, forearm, some on left lower leg;

## 2025-05-13 NOTE — ASSESSMENT
[FreeTextEntry1] : 88 yo hx HTN- controlled, MARIAM - not treated (dx 8 ys ago), OA noted back/ hands/ feet, knees and cervical , PsA  and mild cognitive impairment and + PPD/ QFT.. (for many ys)  patient called for urgent visit  new rash   called for uregetn visit - unable to do TEB and nova has a  new rash  chart reviewed   # rash  discussed with patient that difficutl for me to determine what is causing the rash witout being about to see it  -- rec OV tommorrow with me or with Dermatology  -- will assist patient with getting dermatolgoies    1) PsA: initial ESR 81 / CRP 3.9 -> 9/21 ESR 21, CRP nl  with nl SI joiints 7/20-> 7/22 -> finally with minimal  R SI joint pain/ stiffness- XR 4/24 nl SI joints.  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 stable dose 15 mg wk tolerated fairly well Initially presented with mild synovitis in several IP/ and MCPs and R shoulder pain.  Now with persistent L wrist pain/ swelling.  GIven IACS today L wrist for immediate relief..  and need to consider add on tx/ or increasing MTX, last 5/24 required IACS R shoulder...   NOTE: Tapered off MTX but noted R SI ttt and more severe pustular psoriasis; also bothered by hair loss When necessary excellent response to steroids  - XR Rt. Hip 03/17/22 showed preserved right hip joint space with chronic mild enthesophyte formation along the peripheral right iliac crest and lateral greater trochanter margin, generalized osteopenia - XR LS 03/27/22 showed no compression fractures or spondylosis - unchanged multilevel degenerative disc disease with disc space narrowing, unremarkable SI joints, generalized osteopenia. hand xray neg for CPPD in 2020 Otezla approved but too expensive $$ 1100/ m..  2) Myofascial pain: intermittently severe usually at upper back/ neck. Presents urgently improved severe pain in R neck/ trap area... exquisite TP... given injection today with kenalog 40 mg- last visit + response.  Relatively controlled at this visit   3) Osteopenia: Updated 8/9/22 w/ most severe T score -2.1 with neg VFA and frax 21% overall risk and 7% risk hip fracture... Unable to do comparison 2/2 different equipment. No treatment to date, no hx fractures. Will follow closely... should get updated study now  4) Overweight - Pre-DM?/HTN/ Peripheral edema: Edema: nearly 100% resolved at this time. considerable pitting for several ys, uncomfortable, + US abd 8/22 NAFLD HTN: well controlled Pre-DM - last HgbA1c 6.3 on 09/2021-> stable 1/23 6.2, needs to be coordinated and managed by PCP.. NOTE: Doppler 8/20 negative for DVT (no previous hx) Tx in past w/ diuretics which were stopped when thought to have gout. DOES NOT have GOUT.  5) Mild dyscognition: struggling with short term memory, insight into clinical condition fair at best, was supposed to make appt with neuro but "forgot".. doing fairly well since last visit- caring for self/ son and home- making appointments as needed.- still driving- with no issues reported.  No need to add or adjust at this time. Not sure if she would benefit from treatment to limit progression. Should really f/u with neuro as repeatedly suggested.  Health Mnt: - Colonoscopy > 10 ys ago neg, mammo 2019 neg; no recent CXR , Dexa 2020 nl, will repeat by end of year 2022 Vaccinations:  Needs to hold MTx 1 wk after any vaccination HD influenza annually Shingrix:  done  PNA: given 13 & 23..  Covid: initial vax w/ 1 booster  RSV   Plan: 3/7/25 - IACS L wrist. 40 mg kenalog (of 80 mg vial). if effective need to consider change in tx given recurrent nature.. of obvious inflammatory arthritis.    -Continue Celebrex 200mg daily as needed   -Did not request renewal tramadol  -Continue MTX 6 tabs weekly-> if again + response will  consider increase to 20 mg (4 tabs  am/ 4 tabs pm) - though US was neg for acute synovitis...   - continue  folic acid to 3 mg daily..  everyday besides the day you take the methotrexate to help with hair thinning   -Is aware to call if there is any change in her underlying symptoms- or CP, SOB/ MCCORMICK or severe HA go to ER / call 911  - will have RN team check in..   - 3 m RPA

## 2025-05-13 NOTE — HISTORY OF PRESENT ILLNESS
[FreeTextEntry1] : This telephonic visit was provided via audio only technology.  The patient was located at home [place] at the time of the visit. The provider was located at home [place] at the time of the visit. The patient and Provider participated in the telephonic visit.  Verbal consent for telephonic services was given by the patient.  Patient unable to do a TEB visit   Not a patient of [facility]  called for urgent visit - patient of Marisela Horn  broke out in a rash - now with a patch on the skin  - started the end of the last week  - big blotch on the back on the bakc of the leg and soem on osmel arm  - still itching - not on new medication - still on the same medication  - not sure if got a bug bite - this is the first time this has happen  - had a few rashes in the past on the foot - but that cleared up with the medication  - no changes in diet or medications  on the methotrexate once weekly  does not have a dermatologist   1) OA diffuse 2) PsA: 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

## 2025-05-13 NOTE — HISTORY OF PRESENT ILLNESS
[FreeTextEntry1] : This telephonic visit was provided via audio only technology.  The patient was located at home [place] at the time of the visit. The provider was located at home [place] at the time of the visit. The patient and Provider participated in the telephonic visit.  Verbal consent for telephonic services was given by the patient.  Patient unable to do a TEB visit   Not a patient of [facility]  called for urgent visit - patient of Marisela Horn  broke out in a rash - now with a patch on the skin  - started the end of the last week  - big blotch on the back on the bakc of the leg and soem on osmel arm  - still itching - not on new medication - still on the same medication  - not sure if got a bug bite - this is the first time this has happen  - had a few rashes in the past on the foot - but that cleared up with the medication  - no changes in diet or medications  on the methotrexate once weekly  does not have a dermatologist   1) OA diffuse 2) PsA: 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

## 2025-05-14 NOTE — ASSESSMENT
[FreeTextEntry1] : distribution and time course of rash consistent with bites; no known exposures, no pets   Therapeutic options and their risks and benefits; along with multiple diagnostic possibilities were discussed at length; risks and benefits of further study were discussed;  Lesions are still consistent with bites;  prior lesions seen 1 week ago have resolved;  continue mometasone ointment BID x 1-2 wks f/u 6-8 weeks to recheck, assess whether this is ongoing problem

## 2025-05-14 NOTE — PHYSICAL EXAM
[FreeTextEntry3] : small, erythematous papules, grouped in two/three -  right arm, forearm, all resolved with post inflammatory changes one inflamed lesion left forearm some on left lower leg;

## 2025-05-14 NOTE — HISTORY OF PRESENT ILLNESS
[de-identified] : pt. c/o itchy rashes on arm, leg;  severe at times;  present approx 10 days used OTC products;  some improvement of lesions tx with mometasone referred for re-eval by Rheum, concerned with systemic process

## 2025-05-15 NOTE — REVIEW OF SYSTEMS
[Dry Eyes] : dryness of the eyes [Arthralgias] : arthralgias [Joint Pain] : joint pain [Joint Stiffness] : joint stiffness [Skin Lesions] : skin lesion [Difficulty Walking] : difficulty walking [Negative] : Heme/Lymph [As Noted in HPI] : as noted in HPI [Lower Ext Edema] : no lower extremity edema [SOB on Exertion] : no shortness of breath during exertion [Dysuria] : no dysuria [Dizziness] : no dizziness [FreeTextEntry2] : functional - but struggling lately with c/o L wrist pain- lives with 2 sons (ages 62 & 66) and remains active - wt relatively stable - despite c/o stiffness [FreeTextEntry3] : Previous retinal tear stable - followed closely by ophthalmology and retinal specialist. Denies IED [FreeTextEntry4] : has postnasal drip  [FreeTextEntry5] : denies edema.. [FreeTextEntry8] : hx of incontinence in past.  [FreeTextEntry9] : Resolved LUE wrist swelling w/ IACS but now recurrence with new rash < worse in her hips, knees, and back over the past 2 weeks  [de-identified] : New rash lesions BUE and BLE

## 2025-05-15 NOTE — ADDENDUM
[FreeTextEntry1] : 5/14/25 Patient seen by Dr. Elise 5/14/25 Does not see need for bx at this time would like patient to continue trial prednisone F/u 1 month Task reassigned to Dr. Horn notified

## 2025-05-15 NOTE — HISTORY OF PRESENT ILLNESS
[FreeTextEntry1] : 5/13/25  -Called 5/8/25 with new onset rash started week prior -Notes L wrist swelling resolved completely following IACS but recurred with rash -Ophthalmologist and retinal specialist R eye vision loss started 10 years ago and dry eyes. Using gel lubricant -Not using celebrex -Evaluated by Fr. Elise 5/9/25 felt lesions c/w bugbites. Did not bx at the time. Pt denies outside exposure or known bug bites  05/25 labs: Hgb 11, CRP 12 neg/nl CMP (stable), UA, ESR 22   ----------------------- 3/7/25 started celebrex with minimal benefit L wrist continues to be uncomfortable, achiness/ at times burning and numbness in all 5 fingers.. few days ago noted tightness/ and numbness in R hand Also feels weakness in wrist.. specifically as well as increased pain with use.   labs 2/11/25 at onset CRP 5, ESR 47, Hb 10.3 (stable) with nl WBC, plt 277 Denies sx of UTI despite elevated WBC/ bacteria in urine  Continues with 6 tabs wkly..  MR Cervical spine 3/3/25 IMPRESSION: Cervical spondylosis resulting in moderate left foraminal narrowing at C3/C4 moderate to severe foraminal narrowing at C4/C5. No spinal canal stenosis ---------------------------- 2/7/25 ACUTE Visit -Presents L wrist pain started 1/26/25 notified PCP who ordered XR L wrist no evidence fx or erosions XR notes mild OA changes thorughout -Started Celebrex 200mg daily, pain started to improve 2/4/25 but also noted increased pain L deltoid -Pain is consistent throughout the day, has been elevating hand and using brace (intended for R wrist) -Continues regimen MTX 6 tabs weekly well tolerated -VS stable do not suggest active infection  2/3/25 labs: CRP 5, eGFR 29, chronic anemia, ESR 47; neg/nl UA B12 and folate not completed, no recent sUA   1) OA diffuse 2) PsA: 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 OA noted hands/ feet and knees -  New onset Pain in R hand radiating to elbow and feet/ L knee , stiffness in multiple PIP/ wrist noted initially in Jan with severe stiffness up to 60 mins no other joints at this time... in past 4 ys ago b/l feet, seen by rheum   given wrist splint and steroids w/ + response last dose more than 1 m ago.. G4 G2 2 tubal pregnancies s/p Hysterectomy.  Tx  prednisone taper with + response.   Occas use meloxicam w/ minimal  relief..  Associated w/ chronic fatigue, occas clammy sensation in am, resolves, no lymphadenopathy,  Recently had all teeth pulled on top.. mild persistent nausea every since... no considerable wt gain/ loss no new rashes..   Health Mnt:  - Colonoscopy 10 ys ago neg, mammo 2019 neg; no recent CXR , Dexa few ys ago nl

## 2025-05-15 NOTE — REVIEW OF SYSTEMS
[Dry Eyes] : dryness of the eyes [Arthralgias] : arthralgias [Joint Pain] : joint pain [Joint Stiffness] : joint stiffness [Skin Lesions] : skin lesion [Difficulty Walking] : difficulty walking [Negative] : Heme/Lymph [As Noted in HPI] : as noted in HPI [Lower Ext Edema] : no lower extremity edema [SOB on Exertion] : no shortness of breath during exertion [Dysuria] : no dysuria [Dizziness] : no dizziness [FreeTextEntry2] : functional - but struggling lately with c/o L wrist pain- lives with 2 sons (ages 62 & 66) and remains active - wt relatively stable - despite c/o stiffness [FreeTextEntry3] : Previous retinal tear stable - followed closely by ophthalmology and retinal specialist. Denies IED [FreeTextEntry4] : has postnasal drip  [FreeTextEntry5] : denies edema.. [FreeTextEntry8] : hx of incontinence in past.  [FreeTextEntry9] : Resolved LUE wrist swelling w/ IACS but now recurrence with new rash < worse in her hips, knees, and back over the past 2 weeks  [de-identified] : New rash lesions BUE and BLE

## 2025-05-15 NOTE — ASSESSMENT
[FreeTextEntry1] : 88 yo hx HTN- controlled, MARIAM - not treated (dx 8 ys ago), OA noted back/ hands/ feet, knees and cervical , PsA  and mild cognitive impairment and + PPD/ QFT.. (for many ys)  **ACUTE Visit 2/7/25-> still active pain/ swelling today 3/7/25 -> recurring swelling L wrist 5/13/25 Initial presentation L wrist pain started 1/26/25 notified PCP who ordered XR L wrist no evidence fx or erosions XR notes mild OA changes throughout Pain was consistent throughout the day, has been elevating hand and using brace (intended for R wrist), 3/7/25 presented w/ visible swelling Full resolution L wrist swelling w/ IACS 40mg now w/ recurrence and new rash. Reports new lesions and no improvement w/ steroid topical Rash started early May evaluated by Dr. Elise  5/9/25 and 5/14/25 feels strongly bug bites and will not bx unless persistent 4 weeks. Elevated CRP 4 >12 suggestive of more systemic inflammation Additional updated labs ordered today Started Celebrex 200mg daily with limited benefit.. now struggling with routine tasks   Continues regimen MTX 6 tabs weekly well tolerated, start prednisone 5mg daily. Continue to wear brace  1) PsA: initial ESR 81 / CRP 3.9 -> 9/21 ESR 21, CRP nl  with nl SI joiints 7/20-> 7/22 -> finally with minimal  R SI joint pain/ stiffness- XR 4/24 nl SI joints.  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 stable dose 15 mg wk tolerated fairly well Initially presented with mild synovitis in several IP/ and MCPs and R shoulder pain.  Now with persistent L wrist pain/ swelling.  GIven IACS today L wrist for immediate relief..  and need to consider add on tx/ or increasing MTX, last 5/24 required IACS R shoulder...   NOTE: Tapered off MTX but noted R SI ttt and more severe pustular psoriasis; also bothered by hair loss When necessary excellent response to steroids  - XR Rt. Hip 03/17/22 showed preserved right hip joint space with chronic mild enthesophyte formation along the peripheral right iliac crest and lateral greater trochanter margin, generalized osteopenia - XR LS 03/27/22 showed no compression fractures or spondylosis - unchanged multilevel degenerative disc disease with disc space narrowing, unremarkable SI joints, generalized osteopenia. hand xray neg for CPPD in 2020 Otezla approved but too expensive $$ 1100/ m..  2) Myofascial pain: intermittently severe usually at upper back/ neck. Presents urgently improved severe pain in R neck/ trap area... exquisite TP... given injection today with kenalog 40 mg- last visit + response.  Relatively controlled at this visit   3) Osteopenia: Updated 8/9/22 w/ most severe T score -2.1 with neg VFA and frax 21% overall risk and 7% risk hip fracture... Unable to do comparison 2/2 different equipment. No treatment to date, no hx fractures. Will follow closely... should get updated study now  4) Overweight - Pre-DM?/HTN/ Peripheral edema: Edema: nearly 100% resolved at this time. considerable pitting for several ys, uncomfortable, + US abd 8/22 NAFLD HTN: well controlled Pre-DM - last HgbA1c 6.3 on 09/2021-> stable 1/23 6.2, needs to be coordinated and managed by PCP.. NOTE: Doppler 8/20 negative for DVT (no previous hx) Tx in past w/ diuretics which were stopped when thought to have gout. DOES NOT have GOUT.  5) Mild dyscognition: struggling with short term memory, insight into clinical condition fair at best, was supposed to make appt with neuro but "forgot".. doing fairly well since last visit- caring for self/ son and home- making appointments as needed.- still driving- with no issues reported.  No need to add or adjust at this time. Not sure if she would benefit from treatment to limit progression. Should really f/u with neuro as repeatedly suggested.  Health Mnt: - Colonoscopy > 10 ys ago neg, mammo 2019 neg; no recent CXR , Dexa 2020 nl, will repeat by end of year 2022 Vaccinations:  Needs to hold MTx 1 wk after any vaccination HD influenza annually Shingrix:  done  PNA: given 13 & 23..  Covid: initial vax w/ 1 booster  RSV   Plan: 5/13/25 -Start prednisone 5mg tab daily  -wear brace  -Labs drawn today  -Dr. Elise office will call for appt for biopsy -sent task regarding patient  -Continue MTX 6 tabs weekly-> if again + response will  consider increase to 20 mg (4 tabs  am/ 4 tabs pm) - though US was neg for acute synovitis...   - continue folic acid to 3 mg daily..  everyday besides the day you take the methotrexate to help with hair thinning   -Is aware to call if there is any change in her underlying symptoms- or CP, SOB/ MCCORMICK or severe HA go to ER / call 911  -Keep June 2025 appt  Patient was seen and evaluated by Sandy Gilliam DNP (training in rheumatology) and with CARLOS Horn available for consult regarding patient plan of care.

## 2025-05-15 NOTE — REVIEW OF SYSTEMS
[Dry Eyes] : dryness of the eyes [Arthralgias] : arthralgias [Joint Pain] : joint pain [Joint Stiffness] : joint stiffness [Skin Lesions] : skin lesion [Difficulty Walking] : difficulty walking [Negative] : Heme/Lymph [As Noted in HPI] : as noted in HPI [Lower Ext Edema] : no lower extremity edema [SOB on Exertion] : no shortness of breath during exertion [Dysuria] : no dysuria [Dizziness] : no dizziness [FreeTextEntry2] : functional - but struggling lately with c/o L wrist pain- lives with 2 sons (ages 62 & 66) and remains active - wt relatively stable - despite c/o stiffness [FreeTextEntry3] : Previous retinal tear stable - followed closely by ophthalmology and retinal specialist. Denies IED [FreeTextEntry4] : has postnasal drip  [FreeTextEntry5] : denies edema.. [FreeTextEntry8] : hx of incontinence in past.  [FreeTextEntry9] : Resolved LUE wrist swelling w/ IACS but now recurrence with new rash < worse in her hips, knees, and back over the past 2 weeks  [de-identified] : New rash lesions BUE and BLE

## 2025-05-15 NOTE — DATA REVIEWED
[FreeTextEntry1] : Labs  25 ESR 47, CBC w/ Hb 10.3/ WbC 7.58, plt 277, nl UA 4.8, B12/Folate, CMP with eGFR 49 w/ Cr 1.08, CRP 5  3/20 ESR 81/ CRP 3.26, CMP x Cr 1.35 w/ GFR 36, uric acid 5.0, nl C3/4  SPEP, CBC, vit D 75, TSh 0.97 HbA1c 6.5  neg RF, CCP, CHAU, dsDNA, EDMUNDO, SSA/ B  Imagin25 XR L wrist no evidence fx or erosions XR notes mild OA changes thorughout25 Arterial/ Venous US LUE NEG and MSK L hand/ wrist NEG for synovitis    MR shoulder R :  diffuse tendinosis RTC/ triceps w/ hematoma and partial tear inferior GH ligament, non displaced subchondral fracture, Moderate GH effusion w/ hematoma..   SI joints  nl but chronic enthesopathic change both GTB and peripheral iliac crest margins...  CXR  nl   Hand  nl w/ no erosions but generalized mild osteopenia no discrete lytic or blastic lesions

## 2025-05-15 NOTE — HISTORY OF PRESENT ILLNESS
[FreeTextEntry1] : 5/13/25  -Called 5/8/25 with new onset rash started week prior -Notes L wrist swelling resolved completely following IACS but recurred with rash -Ophthalmologist and retinal specialist R eye vision loss started 10 years ago and dry eyes. Using gel lubricant -Not using celebrex -Evaluated by Fr. Elise 5/9/25 felt lesions c/w bugbites. Did not bx at the time. Pt denies outside exposure or known bug bites  05/25 labs: Hgb 11, CRP 12 neg/nl CMP (stable), UA, ESR 22   ----------------------- 3/7/25 started celebrex with minimal benefit L wrist continues to be uncomfortable, achiness/ at times burning and numbness in all 5 fingers.. few days ago noted tightness/ and numbness in R hand Also feels weakness in wrist.. specifically as well as increased pain with use.   labs 2/11/25 at onset CRP 5, ESR 47, Hb 10.3 (stable) with nl WBC, plt 277 Denies sx of UTI despite elevated WBC/ bacteria in urine  Continues with 6 tabs wkly..  MR Cervical spine 3/3/25 IMPRESSION: Cervical spondylosis resulting in moderate left foraminal narrowing at C3/C4 moderate to severe foraminal narrowing at C4/C5. No spinal canal stenosis ---------------------------- 2/7/25 ACUTE Visit -Presents L wrist pain started 1/26/25 notified PCP who ordered XR L wrist no evidence fx or erosions XR notes mild OA changes thorughout -Started Celebrex 200mg daily, pain started to improve 2/4/25 but also noted increased pain L deltoid -Pain is consistent throughout the day, has been elevating hand and using brace (intended for R wrist) -Continues regimen MTX 6 tabs weekly well tolerated -VS stable do not suggest active infection  2/3/25 labs: CRP 5, eGFR 29, chronic anemia, ESR 47; neg/nl UA B12 and folate not completed, no recent sUA   1) OA diffuse 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 OA noted hands/ feet and knees -  New onset Pain in R hand radiating to elbow and feet/ L knee , stiffness in multiple PIP/ wrist noted initially in Jan with severe stiffness up to 60 mins no other joints at this time... in past 4 ys ago b/l feet, seen by rheum   given wrist splint and steroids w/ + response last dose more than 1 m ago.. G4 G2 2 tubal pregnancies s/p Hysterectomy.  Tx  prednisone taper with + response.   Occas use meloxicam w/ minimal  relief..  Associated w/ chronic fatigue, occas clammy sensation in am, resolves, no lymphadenopathy,  Recently had all teeth pulled on top.. mild persistent nausea every since... no considerable wt gain/ loss no new rashes..   Health Mnt:  - Colonoscopy 10 ys ago neg, mammo 2019 neg; no recent CXR , Dexa few ys ago nl

## 2025-05-15 NOTE — PHYSICAL EXAM
[General Appearance - Alert] : alert [General Appearance - In No Acute Distress] : in no acute distress [General Appearance - Well Nourished] : well nourished [General Appearance - Well Developed] : well developed [General Appearance - Well-Appearing] : healthy appearing [Sclera] : the sclera and conjunctiva were normal [PERRL With Normal Accommodation] : pupils were equal in size, round, and reactive to light [Outer Ear] : the ears and nose were normal in appearance [Examination Of The Oral Cavity] : the lips and gums were normal [Oropharynx] : the oropharynx was normal [Neck Appearance] : the appearance of the neck was normal [Respiration, Rhythm And Depth] : normal respiratory rhythm and effort [] : no respiratory distress [Exaggerated Use Of Accessory Muscles For Inspiration] : no accessory muscle use [Auscultation Breath Sounds / Voice Sounds] : lungs were clear to auscultation bilaterally [Heart Rate And Rhythm] : heart rate was normal and rhythm regular [Heart Sounds] : normal S1 and S2 [Heart Sounds Gallop] : no gallops [Murmurs] : no murmurs [Heart Sounds Pericardial Friction Rub] : no pericardial rub [Veins - Varicosity Changes] : there were no varicosital changes [Breast Appearance] : normal in appearance [Breast Palpation Mass] : no palpable masses [No CVA Tenderness] : no ~M costovertebral angle tenderness [No Spinal Tenderness] : no spinal tenderness [Skin Color & Pigmentation] : normal skin color and pigmentation [Skin Turgor] : normal skin turgor [Motor Exam] : the motor exam was normal [No Focal Deficits] : no focal deficits [Oriented To Time, Place, And Person] : oriented to person, place, and time [Affect] : the affect was normal [Mood] : the mood was normal [FreeTextEntry1] : L wrist swelling and POM. Previously fROM b/l shoulders, L wrist full/ warm, TTP and POM- limited lacking 5d; R wrist sl full and TTP;, L deltoid and forearm ttp- RESOLVED; . Previously BL shoulders lack 5-10 (improved) full abduction active/ passive with POM R and point minimal TTP throughout, previous fullness resolved-  R hip limited by 20-30 degrees and L hip by 10-20 degrees but no POM; bl knees lacking 5-10 degrees R slightly> L; no active synovitis ttt above b/l radial styloid process- RESOLVED, fROM of wrists and fingers,

## 2025-05-15 NOTE — ASSESSMENT
[FreeTextEntry1] : 90 yo hx HTN- controlled, MARIAM - not treated (dx 8 ys ago), OA noted back/ hands/ feet, knees and cervical , PsA  and mild cognitive impairment and + PPD/ QFT.. (for many ys)  **ACUTE Visit 2/7/25-> still active pain/ swelling today 3/7/25 -> recurring swelling L wrist 5/13/25 Initial presentation L wrist pain started 1/26/25 notified PCP who ordered XR L wrist no evidence fx or erosions XR notes mild OA changes throughout Pain was consistent throughout the day, has been elevating hand and using brace (intended for R wrist), 3/7/25 presented w/ visible swelling Full resolution L wrist swelling w/ IACS 40mg now w/ recurrence and new rash. Reports new lesions and no improvement w/ steroid topical Rash started early May evaluated by Dr. Elise  5/9/25 and 5/14/25 feels strongly bug bites and will not bx unless persistent 4 weeks. Elevated CRP 4 >12 suggestive of more systemic inflammation Additional updated labs ordered today Started Celebrex 200mg daily with limited benefit.. now struggling with routine tasks   Continues regimen MTX 6 tabs weekly well tolerated, start prednisone 5mg daily. Continue to wear brace  1) PsA: initial ESR 81 / CRP 3.9 -> 9/21 ESR 21, CRP nl  with nl SI joiints 7/20-> 7/22 -> finally with minimal  R SI joint pain/ stiffness- XR 4/24 nl SI joints.  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 stable dose 15 mg wk tolerated fairly well Initially presented with mild synovitis in several IP/ and MCPs and R shoulder pain.  Now with persistent L wrist pain/ swelling.  GIven IACS today L wrist for immediate relief..  and need to consider add on tx/ or increasing MTX, last 5/24 required IACS R shoulder...   NOTE: Tapered off MTX but noted R SI ttt and more severe pustular psoriasis; also bothered by hair loss When necessary excellent response to steroids  - XR Rt. Hip 03/17/22 showed preserved right hip joint space with chronic mild enthesophyte formation along the peripheral right iliac crest and lateral greater trochanter margin, generalized osteopenia - XR LS 03/27/22 showed no compression fractures or spondylosis - unchanged multilevel degenerative disc disease with disc space narrowing, unremarkable SI joints, generalized osteopenia. hand xray neg for CPPD in 2020 Otezla approved but too expensive $$ 1100/ m..  2) Myofascial pain: intermittently severe usually at upper back/ neck. Presents urgently improved severe pain in R neck/ trap area... exquisite TP... given injection today with kenalog 40 mg- last visit + response.  Relatively controlled at this visit   3) Osteopenia: Updated 8/9/22 w/ most severe T score -2.1 with neg VFA and frax 21% overall risk and 7% risk hip fracture... Unable to do comparison 2/2 different equipment. No treatment to date, no hx fractures. Will follow closely... should get updated study now  4) Overweight - Pre-DM?/HTN/ Peripheral edema: Edema: nearly 100% resolved at this time. considerable pitting for several ys, uncomfortable, + US abd 8/22 NAFLD HTN: well controlled Pre-DM - last HgbA1c 6.3 on 09/2021-> stable 1/23 6.2, needs to be coordinated and managed by PCP.. NOTE: Doppler 8/20 negative for DVT (no previous hx) Tx in past w/ diuretics which were stopped when thought to have gout. DOES NOT have GOUT.  5) Mild dyscognition: struggling with short term memory, insight into clinical condition fair at best, was supposed to make appt with neuro but "forgot".. doing fairly well since last visit- caring for self/ son and home- making appointments as needed.- still driving- with no issues reported.  No need to add or adjust at this time. Not sure if she would benefit from treatment to limit progression. Should really f/u with neuro as repeatedly suggested.  Health Mnt: - Colonoscopy > 10 ys ago neg, mammo 2019 neg; no recent CXR , Dexa 2020 nl, will repeat by end of year 2022 Vaccinations:  Needs to hold MTx 1 wk after any vaccination HD influenza annually Shingrix:  done  PNA: given 13 & 23..  Covid: initial vax w/ 1 booster  RSV   Plan: 5/13/25 -Start prednisone 5mg tab daily  -wear brace  -Labs drawn today  -Dr. Elise office will call for appt for biopsy -sent task regarding patient  -Continue MTX 6 tabs weekly-> if again + response will  consider increase to 20 mg (4 tabs  am/ 4 tabs pm) - though US was neg for acute synovitis...   - continue folic acid to 3 mg daily..  everyday besides the day you take the methotrexate to help with hair thinning   -Is aware to call if there is any change in her underlying symptoms- or CP, SOB/ MCCORMICK or severe HA go to ER / call 911  -Keep June 2025 appt  Patient was seen and evaluated by Sandy Gilliam DNP (training in rheumatology) and with CARLOS Horn available for consult regarding patient plan of care.

## 2025-05-15 NOTE — PHYSICAL EXAM
[General Appearance - Alert] : alert [General Appearance - In No Acute Distress] : in no acute distress [General Appearance - Well Nourished] : well nourished [General Appearance - Well Developed] : well developed [General Appearance - Well-Appearing] : healthy appearing [Sclera] : the sclera and conjunctiva were normal [PERRL With Normal Accommodation] : pupils were equal in size, round, and reactive to light [Outer Ear] : the ears and nose were normal in appearance [Examination Of The Oral Cavity] : the lips and gums were normal [Oropharynx] : the oropharynx was normal [Neck Appearance] : the appearance of the neck was normal [] : no respiratory distress [Respiration, Rhythm And Depth] : normal respiratory rhythm and effort [Exaggerated Use Of Accessory Muscles For Inspiration] : no accessory muscle use [Auscultation Breath Sounds / Voice Sounds] : lungs were clear to auscultation bilaterally [Heart Rate And Rhythm] : heart rate was normal and rhythm regular [Heart Sounds] : normal S1 and S2 [Heart Sounds Gallop] : no gallops [Murmurs] : no murmurs [Heart Sounds Pericardial Friction Rub] : no pericardial rub [Veins - Varicosity Changes] : there were no varicosital changes [Breast Appearance] : normal in appearance [Breast Palpation Mass] : no palpable masses [No CVA Tenderness] : no ~M costovertebral angle tenderness [No Spinal Tenderness] : no spinal tenderness [Skin Color & Pigmentation] : normal skin color and pigmentation [Skin Turgor] : normal skin turgor [Motor Exam] : the motor exam was normal [No Focal Deficits] : no focal deficits [Oriented To Time, Place, And Person] : oriented to person, place, and time [Affect] : the affect was normal [Mood] : the mood was normal [FreeTextEntry1] : L wrist swelling and POM. Previously fROM b/l shoulders, L wrist full/ warm, TTP and POM- limited lacking 5d; R wrist sl full and TTP;, L deltoid and forearm ttp- RESOLVED; . Previously BL shoulders lack 5-10 (improved) full abduction active/ passive with POM R and point minimal TTP throughout, previous fullness resolved-  R hip limited by 20-30 degrees and L hip by 10-20 degrees but no POM; bl knees lacking 5-10 degrees R slightly> L; no active synovitis ttt above b/l radial styloid process- RESOLVED, fROM of wrists and fingers,

## 2025-05-21 NOTE — PHYSICAL EXAM
[FreeTextEntry3] : small, erythematous papules, grouped in two/three -  right arm, Left forearm, all resolved with post inflammatory changes  new grouped lesions on arms, chest, lower legs; also now on face;  b/l cheeks

## 2025-05-21 NOTE — HISTORY OF PRESENT ILLNESS
[de-identified] : pt. c/o itchy rashes on arm, leg;  severe at times;  present approx 10 days used OTC products;  some improvement of lesions tx with mometasone referred for re-eval by Rheum, concerned with systemic process Prior lesions now resolved, but with new eruptions this week

## 2025-05-21 NOTE — ASSESSMENT
[FreeTextEntry1] : distribution and time course of rash consistent with bites; no known exposures, no pets- lives in single family home with adult sons   Therapeutic options and their risks and benefits; along with multiple diagnostic possibilities were discussed at length; risks and benefits of further study were discussed;  Lesions are still consistent with bites;  prior lesions seen 1 week ago have resolved; now with multiple new lesions, all in exposed areas  Punch bx today to r/o other etiologies;   continue mometasone ointment BID x 1-2 wks f/u 1 week for sutures

## 2025-06-30 NOTE — PHYSICAL EXAM
[General Appearance - Alert] : alert [General Appearance - In No Acute Distress] : in no acute distress [General Appearance - Well Nourished] : well nourished [General Appearance - Well Developed] : well developed [General Appearance - Well-Appearing] : healthy appearing [Sclera] : the sclera and conjunctiva were normal [PERRL With Normal Accommodation] : pupils were equal in size, round, and reactive to light [Outer Ear] : the ears and nose were normal in appearance [Examination Of The Oral Cavity] : the lips and gums were normal [Oropharynx] : the oropharynx was normal [Neck Appearance] : the appearance of the neck was normal [] : no respiratory distress [Respiration, Rhythm And Depth] : normal respiratory rhythm and effort [Exaggerated Use Of Accessory Muscles For Inspiration] : no accessory muscle use [Auscultation Breath Sounds / Voice Sounds] : lungs were clear to auscultation bilaterally [Heart Rate And Rhythm] : heart rate was normal and rhythm regular [Heart Sounds] : normal S1 and S2 [Heart Sounds Gallop] : no gallops [Murmurs] : no murmurs [Heart Sounds Pericardial Friction Rub] : no pericardial rub [Veins - Varicosity Changes] : there were no varicosital changes [Breast Appearance] : normal in appearance [Breast Palpation Mass] : no palpable masses [No CVA Tenderness] : no ~M costovertebral angle tenderness [No Spinal Tenderness] : no spinal tenderness [Skin Color & Pigmentation] : normal skin color and pigmentation [Skin Turgor] : normal skin turgor [Motor Exam] : the motor exam was normal [No Focal Deficits] : no focal deficits [Oriented To Time, Place, And Person] : oriented to person, place, and time [Affect] : the affect was normal [Mood] : the mood was normal [FreeTextEntry1] : De Quervain mildly ttp, good ROM, no overt swelling. Previously L wrist swelling and POM. Previously fROM b/l shoulders, L wrist full/ warm, TTP and POM- limited lacking 5d; R wrist sl full and TTP;, L deltoid and forearm ttp- RESOLVED; . Previously BL shoulders lack 5-10 (improved) full abduction active/ passive with POM R and point minimal TTP throughout, previous fullness resolved-  R hip limited by 20-30 degrees and L hip by 10-20 degrees but no POM; bl knees lacking 5-10 degrees R slightly> L; no active synovitis ttt above b/l radial styloid process- RESOLVED, fROM of wrists and fingers,

## 2025-06-30 NOTE — HISTORY OF PRESENT ILLNESS
[FreeTextEntry1] : 6/27/25  -Patient reports seeing spiders in her couch, subsequently threw out couch and now resolution of rash -Recent bx 5/21/25 Dermal hypersensitivity reaction w/ EOS suggestive of reaction of arthropod bite -Pt had hx hay fever and desensitization injections. Has not had for several yeears -Reports similar reaction swelling back of the neck related to mosquito/fly bites -Now taking allegra daily, still taking LD prednisone 5mg daily -Still intermittent L wrist pain difficulty lifting pots on and off the stove. Occasionally wearing brace open to returning to PT/OT  05/25 labs: RA33 16(>8) neg/nl Lupus cascade, APTT, UA, CCP, Strep, RF, C3, C4, DRVTT, UPCR 0.3, ASCA, Cardiolipin, Beta2, PR3, HIV, ANCA, MPO, Cryoglobulin    ------------------------ 5/13/25  -Called 5/8/25 with new onset rash started week prior -Notes L wrist swelling resolved completely following IACS but recurred with rash -Ophthalmologist and retinal specialist R eye vision loss started 10 years ago and dry eyes. Using gel lubricant -Not using celebrex -Evaluated by Fr. Elise 5/9/25 felt lesions c/w bugbites. Did not bx at the time. Pt denies outside exposure or known bug bites  05/25 labs: Hgb 11, CRP 12 neg/nl CMP (stable), UA, ESR 22   ----------------------- 3/7/25 started celebrex with minimal benefit L wrist continues to be uncomfortable, achiness/ at times burning and numbness in all 5 fingers.. few days ago noted tightness/ and numbness in R hand Also feels weakness in wrist.. specifically as well as increased pain with use.   labs 2/11/25 at onset CRP 5, ESR 47, Hb 10.3 (stable) with nl WBC, plt 277 Denies sx of UTI despite elevated WBC/ bacteria in urine  Continues with 6 tabs wkly..  MR Cervical spine 3/3/25 IMPRESSION: Cervical spondylosis resulting in moderate left foraminal narrowing at C3/C4 moderate to severe foraminal narrowing at C4/C5. No spinal canal stenosis ---------------------------- 2/7/25 ACUTE Visit -Presents L wrist pain started 1/26/25 notified PCP who ordered XR L wrist no evidence fx or erosions XR notes mild OA changes thorughout -Started Celebrex 200mg daily, pain started to improve 2/4/25 but also noted increased pain L deltoid -Pain is consistent throughout the day, has been elevating hand and using brace (intended for R wrist) -Continues regimen MTX 6 tabs weekly well tolerated -VS stable do not suggest active infection  2/3/25 labs: CRP 5, eGFR 29, chronic anemia, ESR 47; neg/nl UA B12 and folate not completed, no recent sUA   1) OA diffuse 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 OA noted hands/ feet and knees -  New onset Pain in R hand radiating to elbow and feet/ L knee , stiffness in multiple PIP/ wrist noted initially in Jan with severe stiffness up to 60 mins no other joints at this time... in past 4 ys ago b/l feet, seen by rheum   given wrist splint and steroids w/ + response last dose more than 1 m ago.. G4 G2 2 tubal pregnancies s/p Hysterectomy.  Tx  prednisone taper with + response.   Occas use meloxicam w/ minimal  relief..  Associated w/ chronic fatigue, occas clammy sensation in am, resolves, no lymphadenopathy,  Recently had all teeth pulled on top.. mild persistent nausea every since... no considerable wt gain/ loss no new rashes..   Health Mnt:  - Colonoscopy 10 ys ago neg, mammo 2019 neg; no recent CXR , Dexa few ys ago nl

## 2025-06-30 NOTE — ASSESSMENT
[FreeTextEntry1] : 90 yo hx HTN- controlled, MARIAM - not treated (dx 8 ys ago), OA noted back/ hands/ feet, knees and cervical , PsA  and mild cognitive impairment and + PPD/ QFT.. (for many ys)  1) PsA: initial ESR 81 / CRP 3.9 -> 9/21 ESR 21, CRP nl  with nl SI joiints 7/20-> 7/22 -> finally with minimal  R SI joint pain/ stiffness- XR 4/24 nl SI joints.  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 stable dose 15 mg wk tolerated fairly well Initially presented with mild synovitis in several IP/ and MCPs and R shoulder pain.  Now with persistent L wrist pain/ swelling.  Given IACS today L wrist for immediate relief..  and need to consider add on tx/ or increasing MTX, last 5/24 required IACS R shoulder...  Initial presentation L wrist pain started 1/26/25 notified PCP who ordered XR L wrist no evidence fx or erosions XR notes mild OA changes throughout Recurring swelling and pain L wrist > still active pain/ swelling today 3/7/25 resolved IACS 40mg -> recurring swelling L wrist 5/13/25 Full resolution L wrist swelling w/ IACS 40mg now w/ recurrence and new rash. Reports new lesions and no improvement w/ steroid topical Recent bx 5/21/25 Dermal hypersensitivity reaction w/ EOS suggestive of reaction of arthropod bite 05/25 labs: RA33 16(>8) neg/nl Lupus cascade, APTT, UA, CCP, Strep, RF, C3, C4, DRVTT, UPCR 0.3, ASCA, Cardiolipin, Beta2, PR3, HIV, ANCA, MPO, Cryoglobulin Reports similar reaction to recent mosquito/fly bites posterior neck swelling, and resolution of rash after removing couch, visualized spiders Now mild L wrist pain De quervain fullness. Recommend continue bracing PRN and PT/OT to focus on strengthening Discontinue prednisone 5mg daily continue MTX 6 tabs weekly. Labs before next visit NOTE: Tapered off MTX but noted R SI ttt and more severe pustular psoriasis; also bothered by hair loss When necessary excellent response to steroids  - XR Rt. Hip 03/17/22 showed preserved right hip joint space with chronic mild enthesophyte formation along the peripheral right iliac crest and lateral greater trochanter margin, generalized osteopenia - XR LS 03/27/22 showed no compression fractures or spondylosis - unchanged multilevel degenerative disc disease with disc space narrowing, unremarkable SI joints, generalized osteopenia. hand xray neg for CPPD in 2020 Otezla approved but too expensive $$ 1100/ m..  2) Myofascial pain: intermittently severe usually at upper back/ neck. Presents urgently improved severe pain in R neck/ trap area... exquisite TP... given injection today with kenalog 40 mg- last visit + response.  Relatively controlled at this visit   3) Osteopenia: Updated 8/9/22 w/ most severe T score -2.1 with neg VFA and frax 21% overall risk and 7% risk hip fracture... Unable to do comparison 2/2 different equipment. No treatment to date, no hx fractures. Will follow closely... should get updated study now  4) Overweight - Pre-DM?/HTN/ Peripheral edema: Edema: nearly 100% resolved at this time. considerable pitting for several ys, uncomfortable, + US abd 8/22 NAFLD HTN: well controlled Pre-DM - last HgbA1c 6.3 on 09/2021-> stable 1/23 6.2, needs to be coordinated and managed by PCP.. NOTE: Doppler 8/20 negative for DVT (no previous hx) Tx in past w/ diuretics which were stopped when thought to have gout. DOES NOT have GOUT.  5) Mild dyscognition: struggling with short term memory, insight into clinical condition fair at best, was supposed to make appt with neuro but "forgot".. doing fairly well since last visit- caring for self/ son and home- making appointments as needed.- still driving- with no issues reported.  No need to add or adjust at this time. Not sure if she would benefit from treatment to limit progression. Should really f/u with neuro as repeatedly suggested.  6) Allergic reaction/Rash Recent bx 5/21/25 Dermal hypersensitivity reaction w/ EOS suggestive of reaction of arthropod bite Pt had hx hay fever and desensitization injections. Has not had for several years Reports similar reaction swelling back of the neck related to mosquito/fly bites Now taking allegra daily, still taking LD prednisone 5mg daily likely muting response to other allergens Referred for eval allergist and testing  Health Mnt: - Colonoscopy > 10 ys ago neg, mammo 2019 neg; no recent CXR , Dexa 2020 nl, will repeat by end of year 2022 Vaccinations:  Needs to hold MTx 1 wk after any vaccination HD influenza annually Shingrix:  done  PNA: given 13 & 23..  Covid: initial vax w/ 1 booster  RSV    PLAN 6/27/25 - Referral Allergist Emmy Bass [facility]  - Referral [facility] - over active bladder  - [facility]  -Stop prednisone 5mg  -Continue bracing as needed  -Continue MTX 6 tabs weekly-> if again + response will  consider increase to 20 mg (4 tabs  am/ 4 tabs pm) - though US was neg for acute synovitis...   - continue folic acid to 3 mg daily..  everyday besides the day you take the methotrexate to help with hair thinning   -Is aware to call if there is any change in her underlying symptoms- or CP, SOB/ MCCORMICK or severe HA go to ER / call 911  -Keep September 2025 appt  -Call for sooner appt if worsening symptoms  Patient was seen and evaluated by Sandy Gilliam DNP (training in rheumatology) and with CARLOS Horn available for consult regarding patient plan of care.

## 2025-06-30 NOTE — CONSULT LETTER
[Dear  ___] : Dear  [unfilled], [Referral Letter:] : I am referring [unfilled] to you for further evaluation.  My most recent evaluation follows. [Please see my note below.] : Please see my note below. [Sincerely,] : Sincerely, [FreeTextEntry2] : Emmy Marcelino MD [address], [place] [address] [phone] [FreeTextEntry3] : Sandy Gilliam DNP, FNP-BC Rheumatology [address], [address]  [DrSee  ___] : Dr. SOSA

## 2025-06-30 NOTE — REVIEW OF SYSTEMS
[Dry Eyes] : dryness of the eyes [Arthralgias] : arthralgias [Joint Pain] : joint pain [Joint Stiffness] : joint stiffness [As Noted in HPI] : as noted in HPI [Skin Lesions] : skin lesion [Difficulty Walking] : difficulty walking [Negative] : Heme/Lymph [Lower Ext Edema] : no lower extremity edema [SOB on Exertion] : no shortness of breath during exertion [Dysuria] : no dysuria [Dizziness] : no dizziness [FreeTextEntry2] : functional - but struggling lately with c/o L wrist pain- lives with 2 sons (ages 62 & 66) and remains active - wt relatively stable - despite c/o stiffness [FreeTextEntry3] : Previous retinal tear stable - followed closely by ophthalmology and retinal specialist. Denies IED [FreeTextEntry4] : has postnasal drip  [FreeTextEntry5] : denies edema.. [FreeTextEntry8] : hx of incontinence in past - had eval Dr Downing in the past [FreeTextEntry9] : Still L wrist pain though much improved and mild fullness L de quervain< Resolved LUE wrist swelling w/ IACS but now recurrence with new rash < worse in her hips, knees, and back over the past 2 weeks  [de-identified] : New rash lesions BUE and BLE

## 2025-07-09 NOTE — ASSESSMENT
[FreeTextEntry1] : bites;  confirmed by biopsy; now resolved   Therapeutic options and their risks and benefits; along with multiple diagnostic possibilities were discussed at length; risks and benefits of further study were discussed;  most likely etiology was bedbugs residing in couch at home;  ? brought in by adult sons?  now resolved;  f/u prn if new lesions appear;

## 2025-07-09 NOTE — HISTORY OF PRESENT ILLNESS
[de-identified] : pt. c/o itchy rashes on arm, leg;  severe at times;  some improvement of lesions tx with mometasone referred for re-eval by Rheum, concerned with systemic process Prior lesions now resolved, but with new eruptions 5/2025;  had punch biopsies consistent with bites;  noted bugs in couch at home, no problems since getting rid of couch in 6/2025